# Patient Record
Sex: MALE | Race: WHITE | Employment: FULL TIME | URBAN - METROPOLITAN AREA
[De-identification: names, ages, dates, MRNs, and addresses within clinical notes are randomized per-mention and may not be internally consistent; named-entity substitution may affect disease eponyms.]

---

## 2021-05-16 ENCOUNTER — APPOINTMENT (OUTPATIENT)
Dept: GENERAL RADIOLOGY | Age: 46
End: 2021-05-16
Payer: COMMERCIAL

## 2021-05-16 ENCOUNTER — HOSPITAL ENCOUNTER (EMERGENCY)
Age: 46
Discharge: HOME | End: 2021-05-16
Payer: COMMERCIAL

## 2021-05-16 VITALS
SYSTOLIC BLOOD PRESSURE: 154 MMHG | RESPIRATION RATE: 16 BRPM | OXYGEN SATURATION: 96 % | TEMPERATURE: 97.5 F | WEIGHT: 241.62 LBS | DIASTOLIC BLOOD PRESSURE: 101 MMHG | BODY MASS INDEX: 35.68 KG/M2 | HEART RATE: 109 BPM

## 2021-05-16 DIAGNOSIS — M79.641 HAND PAIN, RIGHT: ICD-10-CM

## 2021-05-16 DIAGNOSIS — S92.355A CLOSED NONDISPLACED FRACTURE OF FIFTH METATARSAL BONE OF LEFT FOOT, INITIAL ENCOUNTER: Primary | ICD-10-CM

## 2021-05-16 DIAGNOSIS — M79.89 SWELLING OF RIGHT HAND: ICD-10-CM

## 2021-05-16 DIAGNOSIS — M79.672 LEFT FOOT PAIN: ICD-10-CM

## 2021-05-16 PROCEDURE — 99284 EMERGENCY DEPT VISIT MOD MDM: CPT

## 2021-05-16 PROCEDURE — 73110 X-RAY EXAM OF WRIST: CPT

## 2021-05-16 PROCEDURE — 73630 X-RAY EXAM OF FOOT: CPT

## 2021-05-16 PROCEDURE — 6370000000 HC RX 637 (ALT 250 FOR IP): Performed by: NURSE PRACTITIONER

## 2021-05-16 RX ORDER — PREDNISONE 10 MG/1
40 TABLET ORAL DAILY
Qty: 20 TABLET | Refills: 0 | Status: SHIPPED | OUTPATIENT
Start: 2021-05-17 | End: 2021-05-22

## 2021-05-16 RX ORDER — CEPHALEXIN 500 MG/1
500 CAPSULE ORAL 4 TIMES DAILY
Qty: 40 CAPSULE | Refills: 0 | Status: SHIPPED | OUTPATIENT
Start: 2021-05-16 | End: 2021-05-26

## 2021-05-16 RX ORDER — ACETAMINOPHEN 500 MG
1000 TABLET ORAL 4 TIMES DAILY PRN
Qty: 60 TABLET | Refills: 0 | Status: SHIPPED | OUTPATIENT
Start: 2021-05-16 | End: 2022-06-28

## 2021-05-16 RX ORDER — CEPHALEXIN 500 MG/1
500 CAPSULE ORAL ONCE
Status: COMPLETED | OUTPATIENT
Start: 2021-05-16 | End: 2021-05-16

## 2021-05-16 RX ORDER — ACETAMINOPHEN 500 MG
1000 TABLET ORAL ONCE
Status: COMPLETED | OUTPATIENT
Start: 2021-05-16 | End: 2021-05-16

## 2021-05-16 RX ORDER — PREDNISONE 20 MG/1
60 TABLET ORAL ONCE
Status: COMPLETED | OUTPATIENT
Start: 2021-05-16 | End: 2021-05-16

## 2021-05-16 RX ADMIN — CEPHALEXIN 500 MG: 500 CAPSULE ORAL at 21:25

## 2021-05-16 RX ADMIN — PREDNISONE 60 MG: 20 TABLET ORAL at 21:26

## 2021-05-16 RX ADMIN — ACETAMINOPHEN 1000 MG: 500 TABLET ORAL at 21:25

## 2021-05-16 ASSESSMENT — PAIN SCALES - GENERAL: PAINLEVEL_OUTOF10: 7

## 2021-05-17 ENCOUNTER — OFFICE VISIT (OUTPATIENT)
Dept: ORTHOPEDIC SURGERY | Age: 46
End: 2021-05-17
Payer: COMMERCIAL

## 2021-05-17 ENCOUNTER — NURSE TRIAGE (OUTPATIENT)
Dept: OTHER | Facility: CLINIC | Age: 46
End: 2021-05-17

## 2021-05-17 ENCOUNTER — TELEPHONE (OUTPATIENT)
Dept: ORTHOPEDIC SURGERY | Age: 46
End: 2021-05-17

## 2021-05-17 VITALS — BODY MASS INDEX: 37.92 KG/M2 | WEIGHT: 256 LBS | HEIGHT: 69 IN | TEMPERATURE: 98.2 F

## 2021-05-17 DIAGNOSIS — M65.9 TENOSYNOVITIS OF RIGHT WRIST: Primary | ICD-10-CM

## 2021-05-17 PROBLEM — M65.931 TENOSYNOVITIS OF RIGHT WRIST: Status: ACTIVE | Noted: 2021-05-17

## 2021-05-17 PROCEDURE — 99203 OFFICE O/P NEW LOW 30 MIN: CPT | Performed by: ORTHOPAEDIC SURGERY

## 2021-05-17 NOTE — LETTER
[facility]  [address] [place] [address] [address] [address]  Phone: [phone]  Fax: [phone]    Cordell Duron MD        May 17, 2021     Patient: Francisco Francis   YOB: 1975   Date of Visit: 5/17/2021       To Whom It May Concern: It is my medical opinion that Francisco Francis can return to work with limited use of right hand for the nex 7 days. If you have any questions or concerns, please don't hesitate to call.     Sincerely,           Cordell Duron MD

## 2021-05-17 NOTE — ED NOTES
.Pt discharged at this time. Discharge instructions and medications reviewed,  Questions were answered. PT verbalized understanding. .  Follow up appointments were discussed.          [facility]  05/16/21 [id number]

## 2021-05-17 NOTE — ED PROVIDER NOTES
[address]  [address] [address]  Dept: [phone]  Loc: [address] ENCOUNTER        This patient was not seen or evaluated by the attending physician. I evaluated this patient, the attending physician was available for consultation. CHIEF COMPLAINT    Chief Complaint   Patient presents with    Hand Pain     patinet states that he started having pain in his right hand last tuesday. Pt. states that the pain then moved into his right finger and wrist and now his right hand is swollen. HPI    Alyce Fletcher is a 39 y.o. male who presents with left hand/wrist pain and swelling, and left foot pain. The onset was 3 weeks ago for his left foot has been intermittent. Left hand pain started a few days ago and migrated down into the left wrist.  Had some swelling of his left hand. No swelling of the left foot. No numbness or tingling distal to either site of complaint. The duration has been constant since the onset. The quality of the pain is sharp and the severity is 5/10. The patient has no other associated injury. The context is no specific injury or trauma event. Foot pain worsens with ambulation, hand pain worsens with movements. Came to the ED for further evaluation and treatment. Is not diabetic or immunocompromised. Does have a history of gout. Is on colchicine and indomethacin. REVIEW OF SYSTEMS    Skin: No lacerations or puncture wounds  Musculoskeletal: see HPI, no other joint or bony injury or pain  Neurologic: No loss of consciousness, no head injury, no paresthesias or focal distal extremity weakness  All other systems reviewed and are negative.     PAST MEDICAL & SURGICAL HISTORY    Past Medical History:   Diagnosis Date    Gout     Seborrheic dermatitis      Past Surgical History:   Procedure Laterality Date    ORTHOPEDIC SURGERY  1992    2ndary to [facility], scar tissue removal       CURRENT MEDICATIONS  (may include discharge medications prescribed in the ED)  Current Outpatient Rx   Medication Sig Dispense Refill    cephALEXin (KEFLEX) 500 MG capsule Take 1 capsule by mouth 4 times daily for 10 days 40 capsule 0    [START ON 5/17/2021] predniSONE (DELTASONE) 10 MG tablet Take 4 tablets by mouth daily for 5 doses 20 tablet 0    acetaminophen (TYLENOL) 500 MG tablet Take 2 tablets by mouth 4 times daily as needed for Pain 60 tablet 0    colchicine (COLCRYS) 0.6 MG tablet Take 1 tablet by mouth daily. 30 tablet 0    indomethacin (INDOCIN) 50 MG capsule Take 1 capsule by mouth 3 times daily. 60 capsule 0    allopurinol (ZYLOPRIM) 300 MG tablet TAKE ONE TABLET BY MOUTH EVERY DAY 30 tablet 5    ketoconazole (NIZORAL) 2 % cream Apply  topically daily. Apply topically daily. 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- [employer]   Tobacco Use    Smoking status: Never Smoker   Substance and Sexual Activity    Alcohol use: Yes     Comment: socially    Drug use: Not on file    Sexual activity: Not on file   Other Topics Concern    Not on file   Social History Narrative    Not on file     Social Determinants of Health     Financial Resource Strain:     Difficulty of Paying Living Expenses:    Food Insecurity:     Worried About Running Out of Food in the Last Year:     [address] in the Last Year:    Transportation Needs:     Lack of Transportation (Medical):      Lack of Transportation (Non-Medical):    Physical Activity:     Days of Exercise per Week:     Minutes of Exercise per Session:    Stress:     Feeling of Stress :    Social Connections:     Frequency of Communication with Friends and Family:     Frequency of Social Gatherings with Friends and Family:     Attends Zoroastrianism Services:     Active Member of Clubs or Organizations:     Attends Club or Organization Meetings:     Marital Status:    Intimate Partner Violence:     Fear of Current or Ex-Partner:     Emotionally Abused:     Physically Abused:     Sexually Abused:      Family History   Problem Relation Age of Onset    Cancer Father         Pancreatic    Diabetes Father          PHYSICAL EXAM    VITAL SIGNS: BP (!) 154/101   Pulse 109   Temp 97.5 °F (36.4 °C) (Temporal)   Resp 16   Wt 241 lb 10 oz (109.6 kg)   SpO2 96%   BMI 35.68 kg/m²   Constitutional:  Well nourished, no acute distress  HENT:  Atraumatic, moist mucous membranes  NECK: normal range of motion,  supple   Respiratory:  No respiratory distress  Cardiovascular:  No JVD  Vascular: left radial and right pedal pulses 2+, capillary refill less than 2 seconds  Musculoskeletal:  No overt tenderness to palpation of the left hand. Does have pain with movement of his left thumb. There is overt edema to the left hand. No acute deformity. There is some very mild erythema that is less than 2 cm on the dorsal aspect of the left hand. Is not overtly hot to the touch. Right foot does not have any pain with palpation, no edema, no deformity. No overlying erythema. Integument:  Well hydrated, no skin lacerations  Neurologic:  Awake alert, no slurred speech, sensory and motor intact    RADIOLOGY   XR FOOT LEFT (MIN 3 VIEWS)   Final Result   Nondisplaced fracture along the base of the proximal phalanx of the 5th digit. XR WRIST RIGHT (MIN 3 VIEWS)   Final Result   No acute osseous abnormality. ED COURSE & MEDICAL DECISION MAKING   See chart for medications given during emergency department course.     Differential diagnosis: Arterial Injury/Ischemia, Fracture, Dislocation, Infection, Compartment Syndrome, Neurologic Deficit/Injury, ligamental injury, musculoskeletal pain, other    No evidence of neurovascular injury on exam.  Plain films as above. Given the right hand swelling I will place him on a short course of prednisone, cover him with Keflex for any concomitant early cellulitis that may be developing. Could also be gout which the prednisone will cover. Regarding the foot: A postop shoe was given since this is been ongoing for 3 weeks with no injury event. He has been weightbearing over the past few weeks. Will be started on some APAP for the left foot pain. He does need to follow-up with orthopedics, given that he was already told to follow-up regarding his hand he can be seen by orthopedics for his left 5th metatarsal fracture as well. I estimate there is LOW risk for COMPARTMENT SYNDROME, DEEP VENOUS THROMBOSIS, SEPTIC ARTHRITIS, TENDON OR NEUROVASCULAR INJURY, thus I consider the discharge disposition reasonable. Nestor Church and I have discussed the diagnosis and risks, and we agree with discharging home to follow-up with their primary doctor or the referral orthopedist. We also discussed returning to the Emergency Department immediately if new or worsening symptoms occur. We have discussed the symptoms which are most concerning (e.g., changing or worsening pain, numbness, weakness) that necessitate immediate return. The patient verbalized understanding, have no further questions or concerns and are in agreement with the plan of discharge. Blood pressure (!) 154/101, pulse 109, temperature 97.5 °F (36.4 °C), temperature source Temporal, resp. rate 16, weight 241 lb 10 oz (109.6 kg), SpO2 96 %. FINAL IMPRESSION    1. Closed nondisplaced fracture of fifth metatarsal bone of left foot, initial encounter    2. Hand pain, right    3. Swelling of right hand    4.  Left foot pain        PLAN  Discharge with outpatient follow-up and discharge instructions (see EMR)    (Please note that this note was completed with a voice recognition program.  Every attempt was made to edit the dictations, but inevitably there remain words that are mis-transcribed.)          AMI Moreno - ABIGAIL  05/16/21 [id number]

## 2021-05-17 NOTE — PROGRESS NOTES
Impression: Acute inflammatory soft tissue disease right wrist.  Possible early carpal tunnel syndrome. The nature of this medical problem is fully discussed with the patient, including all treatment options. All questions are answered. He is asked to continue the oral steroids as prescribed. I expect his improvement to continue. He is to make an appointment with his PCP and be evaluated for medical problems associated with this type of problem. If his symptoms persist, change or worsen, he is asked to call or return.

## 2021-05-17 NOTE — TELEPHONE ENCOUNTER
or concerns; instructed to call back for any new or worsening symptoms. Writer provided warm transfer to Nahun Avery at [facility] for appointment scheduling. Attention Provider: Thank you for allowing me to participate in the care of your patient. The patient was connected to triage in response to information provided to the ECC. Please do not respond through this encounter as the response is not directed to a shared pool.

## 2021-05-19 ENCOUNTER — OFFICE VISIT (OUTPATIENT)
Dept: ORTHOPEDIC SURGERY | Age: 46
End: 2021-05-19
Payer: COMMERCIAL

## 2021-05-19 ENCOUNTER — OFFICE VISIT (OUTPATIENT)
Dept: PRIMARY CARE CLINIC | Age: 46
End: 2021-05-19
Payer: COMMERCIAL

## 2021-05-19 VITALS
HEART RATE: 83 BPM | HEIGHT: 69 IN | WEIGHT: 241.2 LBS | DIASTOLIC BLOOD PRESSURE: 89 MMHG | BODY MASS INDEX: 35.73 KG/M2 | SYSTOLIC BLOOD PRESSURE: 128 MMHG

## 2021-05-19 VITALS — BODY MASS INDEX: 35.7 KG/M2 | WEIGHT: 241 LBS | TEMPERATURE: 98.2 F | HEIGHT: 69 IN

## 2021-05-19 DIAGNOSIS — M1A.9XX0 CHRONIC GOUT INVOLVING TOE OF LEFT FOOT WITHOUT TOPHUS, UNSPECIFIED CAUSE: ICD-10-CM

## 2021-05-19 DIAGNOSIS — Z13.220 SCREENING CHOLESTEROL LEVEL: ICD-10-CM

## 2021-05-19 DIAGNOSIS — M92.61 HAGLUND'S DEFORMITY OF BOTH HEELS: ICD-10-CM

## 2021-05-19 DIAGNOSIS — M79.671 RIGHT FOOT PAIN: Primary | ICD-10-CM

## 2021-05-19 DIAGNOSIS — M92.62 HAGLUND'S DEFORMITY OF BOTH HEELS: ICD-10-CM

## 2021-05-19 DIAGNOSIS — M76.61 ACHILLES TENDINITIS OF BOTH LOWER EXTREMITIES: ICD-10-CM

## 2021-05-19 DIAGNOSIS — M79.641 RIGHT HAND PAIN: ICD-10-CM

## 2021-05-19 DIAGNOSIS — M76.62 ACHILLES TENDINITIS OF BOTH LOWER EXTREMITIES: ICD-10-CM

## 2021-05-19 DIAGNOSIS — E66.09 CLASS 2 OBESITY DUE TO EXCESS CALORIES WITHOUT SERIOUS COMORBIDITY WITH BODY MASS INDEX (BMI) OF 37.0 TO 37.9 IN ADULT: Primary | ICD-10-CM

## 2021-05-19 LAB
A/G RATIO: 1.9 (ref 1.1–2.2)
ALBUMIN SERPL-MCNC: 4.7 G/DL (ref 3.4–5)
ALP BLD-CCNC: 62 U/L (ref 40–129)
ALT SERPL-CCNC: 31 U/L (ref 10–40)
ANION GAP SERPL CALCULATED.3IONS-SCNC: 15 MMOL/L (ref 3–16)
AST SERPL-CCNC: 21 U/L (ref 15–37)
BILIRUB SERPL-MCNC: 0.3 MG/DL (ref 0–1)
BUN BLDV-MCNC: 16 MG/DL (ref 7–20)
CALCIUM SERPL-MCNC: 9.7 MG/DL (ref 8.3–10.6)
CHLORIDE BLD-SCNC: 102 MMOL/L (ref 99–110)
CHOLESTEROL, TOTAL: 225 MG/DL (ref 0–199)
CO2: 25 MMOL/L (ref 21–32)
CREAT SERPL-MCNC: 0.9 MG/DL (ref 0.9–1.3)
GFR AFRICAN AMERICAN: >60
GFR NON-AFRICAN AMERICAN: >60
GLOBULIN: 2.5 G/DL
GLUCOSE BLD-MCNC: 103 MG/DL (ref 70–99)
HDLC SERPL-MCNC: 51 MG/DL (ref 40–60)
LDL CHOLESTEROL CALCULATED: 155 MG/DL
POTASSIUM SERPL-SCNC: 4.5 MMOL/L (ref 3.5–5.1)
SODIUM BLD-SCNC: 142 MMOL/L (ref 136–145)
TOTAL PROTEIN: 7.2 G/DL (ref 6.4–8.2)
TRIGL SERPL-MCNC: 97 MG/DL (ref 0–150)
URIC ACID, SERUM: 6.9 MG/DL (ref 3.5–7.2)
VLDLC SERPL CALC-MCNC: 19 MG/DL

## 2021-05-19 PROCEDURE — 99243 OFF/OP CNSLTJ NEW/EST LOW 30: CPT | Performed by: ORTHOPAEDIC SURGERY

## 2021-05-19 PROCEDURE — 99214 OFFICE O/P EST MOD 30 MIN: CPT | Performed by: FAMILY MEDICINE

## 2021-05-19 RX ORDER — NAPROXEN 500 MG/1
500 TABLET ORAL 2 TIMES DAILY WITH MEALS
Qty: 60 TABLET | Refills: 0 | Status: SHIPPED | OUTPATIENT
Start: 2021-05-19 | End: 2021-06-15

## 2021-05-19 RX ORDER — COLCHICINE 0.6 MG/1
0.6 TABLET ORAL DAILY
Qty: 30 TABLET | Refills: 3 | Status: SHIPPED | OUTPATIENT
Start: 2021-05-19 | End: 2022-06-28 | Stop reason: SDUPTHER

## 2021-05-19 ASSESSMENT — ENCOUNTER SYMPTOMS
DIARRHEA: 0
COUGH: 0
ABDOMINAL PAIN: 0
NAUSEA: 0
VOMITING: 0
SHORTNESS OF BREATH: 0
CONSTIPATION: 0

## 2021-05-19 ASSESSMENT — PATIENT HEALTH QUESTIONNAIRE - PHQ9
SUM OF ALL RESPONSES TO PHQ QUESTIONS 1-9: 0
SUM OF ALL RESPONSES TO PHQ QUESTIONS 1-9: 0

## 2021-05-19 NOTE — LETTER
[facility]  [facility] [address] [address]  Phone: [phone]  Fax: [phone]    Greg Harrison MD        May 19, 2021     Patient: Tracy Larsen   YOB: 1975   Date of Visit: 5/19/2021       To Whom It May Concern: It is my medical opinion that Tracy Larsen should remain out of work until 5/22/2021. He may return to work 5/23/2021 with no restrictions. If you have any questions or concerns, please don't hesitate to call.     Sincerely,          Greg Harrison MD

## 2021-05-19 NOTE — PROGRESS NOTES
Chief Complaint   Patient presents with   Aruna Benjamin New Doctor    Obesity    Other     Tenosynovitis  Right wrist       HPI: Yaz Juan presents for evaluation and management of right hand pain, gout, obesity. Francisco Francis returns to clinic after an extended hiatus of over 7 years. He had about a 6-week history of bilateral foot pain left worse than right both in the ankle and the midfoot. He went to the emergency room when he was diagnosed with a left fifth proximal phalangeal fracture and put in a rigid soled shoe. He has follow-up with Dr. Umer Black later today    He also notes an 8-day history of right hand pain that initially started along his right fifth metacarpal bone after 2 days it migrated to his metacarpophalangeal joints in 3 days and 4 days into it it settled into his right first metacarpal phalangeal joint and wrist joint. Patient noted he had pain with just passive movement of the joint and the whole hand was swollen and red. He has a history of chronic gout in his left great toe. He was previously treated with allopurinol and colchicine but was lost to follow-up back in 2014. He notes he eats a lot of chicken but has given up a lot of alcohol use. Today's PHQ:    PHQ Scores 5/19/2021   PHQ2 Score 0   PHQ9 Score 0     Interpretation of Total Score Depression Severity: 1-4 = Minimal depression, 5-9 = Mild depression, 10-14 = Moderate depression, 15-19 = Moderately severe depression, 20-27 = Severe depression        Review of Systems   Constitutional: Negative for chills and fever. Respiratory: Negative for cough and shortness of breath. Cardiovascular: Negative for chest pain and palpitations. Gastrointestinal: Negative for abdominal pain, constipation, diarrhea, nausea and vomiting. Endocrine: Negative for polyuria. Genitourinary: Negative for dysuria. Musculoskeletal: Positive for arthralgias and joint swelling.        No Known Allergies  New Prescriptions    COLCHICINE (COLCRYS) 0.6 MG TABLET    Take 1 tablet by mouth daily     Current Outpatient Medications   Medication Sig Dispense Refill    colchicine (COLCRYS) 0.6 MG tablet Take 1 tablet by mouth daily 30 tablet 3    cephALEXin (KEFLEX) 500 MG capsule Take 1 capsule by mouth 4 times daily for 10 days 40 capsule 0    predniSONE (DELTASONE) 10 MG tablet Take 4 tablets by mouth daily for 5 doses 20 tablet 0    acetaminophen (TYLENOL) 500 MG tablet Take 2 tablets by mouth 4 times daily as needed for Pain 60 tablet 0     No current facility-administered medications for this visit. Past Medical History:   Diagnosis Date    Gout     Seborrheic dermatitis          Objective   /89   Pulse 83   Ht 5' 9\" (1.753 m)   Wt 241 lb 3.2 oz (109.4 kg)   BMI 35.62 kg/m²   Wt Readings from Last 3 Encounters:   05/19/21 241 lb 3.2 oz (109.4 kg)   05/17/21 256 lb (116.1 kg)   05/16/21 241 lb 10 oz (109.6 kg)       Physical Exam  Constitutional:       Appearance: He is well-developed. He is obese. Cardiovascular:      Rate and Rhythm: Normal rate and regular rhythm. Heart sounds: No murmur heard. No friction rub. No gallop. Pulmonary:      Effort: Pulmonary effort is normal.      Breath sounds: Normal breath sounds. No wheezing or rales. Abdominal:      General: Bowel sounds are normal. There is no distension. Palpations: Abdomen is soft. There is no mass. Tenderness: There is no abdominal tenderness. Musculoskeletal:      Comments: Tenderness of right first MCP joint and swelling of right wrist and first MCP joint and palm. Some pain on passive movement of these joints   Skin:     General: Skin is warm and dry. Findings: No rash.            Chemistry        Component Value Date/Time     10/13/2011 1816    K 4.6 10/13/2011 1816     10/13/2011 1816    CO2 32 10/13/2011 1816    BUN 16 10/13/2011 1816    CREATININE 0.9 10/13/2011 1816        Component Value Date/Time    CALCIUM 9.9 10/13/2011 1816    ALKPHOS 63 10/13/2011 1816    AST 38 (H) 10/13/2011 1816    ALT 59 (H) 10/13/2011 1816    BILITOT 0.60 10/13/2011 1816          No results found for: WBC, HGB, HCT, MCV, PLT  No results found for: LABA1C  No results found for: EAG  No results found for: LABA1C  No components found for: CHLPL  No results found for: TRIG  No results found for: HDL  No results found for: LDLCALC  No results found for: LABVLDL      Assessment   Plan   1. Class 2 obesity due to excess calories without serious comorbidity with body mass index (BMI) of 37.0 to 37.9 in adult   Counseled briefly on diet. Encouraged patient to follow low purine diet for now. We will readdress when he comes back in 1 month  2. Chronic gout involving toe of left foot without tophus, unspecified cause  Assessment & Plan:   suspect may be cause of patient's wrist pain, will check labs and trial colchicine. Orders:  -     Comprehensive Metabolic Panel; Future  -     Uric Acid; Future  -     colchicine (COLCRYS) 0.6 MG tablet; Take 1 tablet by mouth daily, Disp-30 tablet, R-3Normal  3. Screening cholesterol level  -     Comprehensive Metabolic Panel; Future  -     Lipid Panel; Future  4. Right hand pain  As above  Diann Mathew received counseling on the following healthy behaviors: nutrition    Patient given educational materials on Nutrition      Discussed use, benefit, and side effects of prescribed medications. Barriers to medication compliance addressed. All patient questions answered. Pt voiced understanding. RTC Return in about 4 weeks (around 6/16/2021).

## 2021-05-19 NOTE — PROGRESS NOTES
CHIEF COMPLAINT: Bilateral posterior heel pain/Nereyda's deformity with insertinal Achillis tendenosis. HISTORY:  Mr. Villeda Friend 39 y.o.  male presents today for consultation request from Aniceto Jama MD for evaluation of bilateral posterior heel pain which started started worsening a year ago. He stated he has a job that requires a lot of walking.  He initially presented to [address] [place] [address] on 5/16/2021 where he was x-rayed and placed in a orthopedic shoe and told him that he had a toe fracture. He does not remember ever injuring his toe. He is complaining of achy  pain that is intermittent and that is in the back of bilateral heels. Pain is increase with standing and walking and shoe wear. Rates pain a 5/10 VAS. Pain is sharp early in the morning with first few steps, dull achy pain by the end of the day. No radiation and no numbness and tingling sensation. No other complaint.       Past Medical History:   Diagnosis Date    Gout     Seborrheic dermatitis        Past Surgical History:   Procedure Laterality Date    ORTHOPEDIC SURGERY  1992    2ndary to [facility], scar tissue removal       Social History     Socioeconomic History    Marital status: Single     Spouse name: Napoleon Grijalva Number of children: 0    Years of education: Not on file    Highest education level: Not on file   Occupational History    Occupation: Claims Rep- [employer]   Tobacco Use    Smoking status: Never Smoker    Smokeless tobacco: Never Used   Vaping Use    Vaping Use: Never used   Substance and Sexual Activity    Alcohol use: Yes     Comment: socially    Drug use: Never    Sexual activity: Not on file   Other Topics Concern    Not on file   Social History Narrative    Not on file     Social Determinants of Health     Financial Resource Strain:     Difficulty of Paying Living Expenses:    Food Insecurity:     Worried About Running Out of Food in the Last Year:     [address] in the Last Year: dorsiflexion to about 10 degrees bilaterally, which increased with knee flexion. He has intact sensation and good pedal pulses.  He has good strength in all four planes, including eversion, and has moderate tenderness on deep palpation over the bilateral posterior calcaneal tubercle, with prominent Nereyda's compared to the other side.  The ankles are stable to drawer test bilaterally, equally.  He is no tenderness to palpation over the fifth toe of the left foot. There is no swelling or ecchymosis left foot near the toes. IMAGING:  Xray's were reviewed.  3 views of the left foot dated 5/16/2021 and 3 views of the right foot taken in office today, and showed no acute fracture. 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 PLAN: I discussed with the patient the treatment options. We recommended stretching exercises of the calf which was taught to the patient today. He will take NSAIDS Naprosyn. Use backless shoes. He can discontinue the postop shoe. F/u in 6 weeks, PT if needed. He understands that this may need surgery if the pain did not to resolve. Thank you very much for the kind consultation and allowing me to participate in this patient's care. I will continue to keep you apprised of his progress.         Licha Escoto MD

## 2021-05-19 NOTE — PATIENT INSTRUCTIONS
Patient Education        Gout: Care Instructions  Overview     Gout is a form of arthritis caused by a buildup of uric acid crystals in a joint. It causes sudden attacks of pain, swelling, redness, and stiffness, usually in one joint, especially the big toe. Gout usually comes on without a cause. But it can be brought on by drinking alcohol (especially beer), eating or drinking things made with high-fructose corn syrup, or eating seafood or red meat. Taking certain medicines, such as diuretics, can also trigger an attack of gout. Taking your medicines as prescribed and following up with your doctor regularly can help you avoid gout attacks in the future. Follow-up care is a key part of your treatment and safety. Be sure to make and go to all appointments, and call your doctor if you are having problems. It's also a good idea to know your test results and keep a list of the medicines you take. How can you care for yourself at home? · If the joint is swollen, put ice or a cold pack on the area for 10 to 20 minutes at a time. Put a thin cloth between the ice and your skin. · Prop up the sore limb on a pillow when you ice it or anytime you sit or lie down during the next 3 days. Try to keep it above the level of your heart. This can help reduce swelling. · Rest sore joints. Avoid activities that put weight or strain on the joints for a few days. Take short rest breaks from your regular activities during the day. · Take your medicines exactly as prescribed. Call your doctor if you think you are having a problem with your medicine. · Take pain medicines exactly as directed. ? If the doctor gave you a prescription medicine for pain, take it as prescribed. ? If you are not taking a prescription pain medicine, ask your doctor if you can take an over-the-counter medicine. · Eat less seafood and red meat. · Avoid foods or drinks that are made with high-fructose corn syrup.   · Check with your doctor before drinking alcohol. · Losing weight, if you are overweight, may help reduce attacks of gout. But do not go on a diet that causes rapid weight loss. Losing a lot of weight in a short amount of time can cause a gout attack. When should you call for help? Call your doctor now or seek immediate medical care if:    · You have a fever.     · The joint is so painful you cannot use it.     · You have sudden, unexplained swelling, redness, warmth, or severe pain in one or more joints. Watch closely for changes in your health, and be sure to contact your doctor if:    · You have joint pain.     · Your symptoms get worse or are not improving after 2 or 3 days. Where can you learn more? Go to [email or web address] and sign in to your [employer] account. Enter Z311 in the Epoque box to learn more about \"Gout: Care Instructions. \"     If you do not have an account, please click on the \"Sign Up Now\" link. Current as of: August 5, 2020               Content Version: 12.8  © 2006-2021 [employer]. Care instructions adapted under license by [facility] ([facility]). If you have questions about a medical condition or this instruction, always ask your healthcare professional. [address] any warranty or liability for your use of this information. Patient Education        Purine-Restricted Diet: Care Instructions  Your Care Instructions     Purines are substances that are found in some foods. Your body turns purines into uric acid. High levels of uric acid can cause gout, which is a form of arthritis that causes pain and inflammation in joints. You may be able to help control the amount of uric acid in your body by limiting high-purine foods in your diet. Follow-up care is a key part of your treatment and safety. Be sure to make and go to all appointments, and call your doctor if you are having problems.  It's also a good idea to know your test results and keep

## 2021-05-21 ENCOUNTER — TELEPHONE (OUTPATIENT)
Dept: ORTHOPEDIC SURGERY | Age: 46
End: 2021-05-21

## 2021-05-24 PROBLEM — M92.62 HAGLUND'S DEFORMITY OF BOTH HEELS: Status: ACTIVE | Noted: 2021-05-24

## 2021-05-24 PROBLEM — M92.61 HAGLUND'S DEFORMITY OF BOTH HEELS: Status: ACTIVE | Noted: 2021-05-24

## 2021-05-24 PROBLEM — M76.62 ACHILLES TENDINITIS OF BOTH LOWER EXTREMITIES: Status: ACTIVE | Noted: 2021-05-24

## 2021-05-24 PROBLEM — M76.61 ACHILLES TENDINITIS OF BOTH LOWER EXTREMITIES: Status: ACTIVE | Noted: 2021-05-24

## 2021-06-15 DIAGNOSIS — M79.671 RIGHT FOOT PAIN: ICD-10-CM

## 2021-06-15 RX ORDER — NAPROXEN 500 MG/1
TABLET ORAL
Qty: 60 TABLET | Refills: 0 | Status: SHIPPED | OUTPATIENT
Start: 2021-06-15 | End: 2022-06-28

## 2021-07-02 ENCOUNTER — OFFICE VISIT (OUTPATIENT)
Dept: ORTHOPEDIC SURGERY | Age: 46
End: 2021-07-02
Payer: COMMERCIAL

## 2021-07-02 VITALS — BODY MASS INDEX: 35.7 KG/M2 | WEIGHT: 241 LBS | HEIGHT: 69 IN | RESPIRATION RATE: 16 BRPM

## 2021-07-02 DIAGNOSIS — M92.61 HAGLUND'S DEFORMITY OF BOTH HEELS: Primary | ICD-10-CM

## 2021-07-02 DIAGNOSIS — M76.62 ACHILLES TENDINITIS OF BOTH LOWER EXTREMITIES: ICD-10-CM

## 2021-07-02 DIAGNOSIS — M76.61 ACHILLES TENDINITIS OF BOTH LOWER EXTREMITIES: ICD-10-CM

## 2021-07-02 DIAGNOSIS — M92.62 HAGLUND'S DEFORMITY OF BOTH HEELS: Primary | ICD-10-CM

## 2021-07-02 PROCEDURE — 99213 OFFICE O/P EST LOW 20 MIN: CPT | Performed by: ORTHOPAEDIC SURGERY

## 2021-07-02 NOTE — PROGRESS NOTES
CHIEF COMPLAINT: Bilateral posterior heel pain/Nereyda's deformity with insertinal Achillis tendenosis. HISTORY:  Mr. Romy Sprague 39 y.o.  male presents today for f/u and reported significant improvement with stretching. He was seen on 5/19/2021 as a consultation request from Yolis Tam MD for evaluation of bilateral posterior heel pain which started started worsening a year ago. He stated he has a job that requires a lot of walking.  He initially presented to [facility] on 5/16/2021 where he was x-rayed and placed in a orthopedic shoe and told him that he had a toe fracture. He does not remember ever injuring his toe. He reported today no pain, 0/10 VAS. No radiation and no numbness and tingling sensation. No other complaint. Past Medical History:   Diagnosis Date    Gout     Seborrheic dermatitis        Past Surgical History:   Procedure Laterality Date    ORTHOPEDIC SURGERY  1992    2ndary to [facility] RLE, scar tissue removal       Social History     Socioeconomic History    Marital status: Single     Spouse name: Gail Bueno Number of children: 0    Years of education: Not on file    Highest education level: Not on file   Occupational History    Occupation: Claims Rep[employer]   Tobacco Use    Smoking status: Never Smoker    Smokeless tobacco: Never Used   Vaping Use    Vaping Use: Never used   Substance and Sexual Activity    Alcohol use: Yes     Comment: socially    Drug use: Never    Sexual activity: Not on file   Other Topics Concern    Not on file   Social History Narrative    Not on file     Social Determinants of Health     Financial Resource Strain:     Difficulty of Paying Living Expenses:    Food Insecurity:     Worried About [address] in the Last Year:     [address] in the Last Year:    Transportation Needs:     Lack of Transportation (Medical):      Lack of Transportation (Non-Medical):    Physical Activity:     Days of Exercise per Week:     Minutes of Exercise per Session:    Stress:     Feeling of Stress :    Social Connections:     Frequency of Communication with Friends and Family:     Frequency of Social Gatherings with Friends and Family:     Attends Hoahaoism Services:     Active Member of Clubs or Organizations:     Attends Club or Organization Meetings:     Marital Status:    Intimate Partner Violence:     Fear of Current or Ex-Partner:     Emotionally Abused:     Physically Abused:     Sexually Abused:        Family History   Problem Relation Age of Onset    Cancer Father         Pancreatic    Diabetes Father        Current Outpatient Medications on File Prior to Visit   Medication Sig Dispense Refill    naproxen (NAPROSYN) 500 MG tablet TAKE 1 TABLET BY MOUTH TWICE DAILY WITH MEALS 60 tablet 0    colchicine (COLCRYS) 0.6 MG tablet Take 1 tablet by mouth daily 30 tablet 3    acetaminophen (TYLENOL) 500 MG tablet Take 2 tablets by mouth 4 times daily as needed for Pain 60 tablet 0     No current facility-administered medications on file prior to visit. Pertinent items are noted in HPI  Review of systems reviewed from Patient History Form dated on 5/17/2021 and available in the patient's chart under the Media tab. No change noted. PHYSICAL EXAMINATION:  Mr. Dacia Arias is a very pleasant 39 y.o.  male who presents today in no acute distress, awake, alert, and oriented. He is well dressed, nourished and  groomed. Patient with normal affect. Height is  5' 9\" (1.753 m), weight is 241 lb (109.3 kg), Body mass index is 35.59 kg/m². Resting respiratory rate is 16. Examination of the gait, showed that the patient walks heel-toe with a non-antalgic gait and no limp.  Examination of both ankles showing a good range of motion.  He has dorsiflexion to about 10 degrees bilaterally, which increased with knee flexion.  He has intact sensation and good pedal pulses.  He has good strength in all four planes, including eversion, and has no tenderness on deep palpation over the bilateral posterior calcaneal tubercle, with prominent Nereyda's compared to the other side.  The ankles are stable to drawer test bilaterally, equally.  He is no tenderness to palpation over the fifth toe of the left foot. There is no swelling or ecchymosis left foot near the toes. IMAGING:  Xray's were reviewed.  3 views of the left foot dated 5/16/2021 and 3 views of the right foot taken in office today, and showed no acute fracture. 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 PLAN: I discussed with the patient the treatment options. We recommended continue stretching exercises of the calf which was taught to the patient. He will take NSAIDS Naprosyn PRN. Use backless shoes. F/U PRN.       Yoan Kwon MD

## 2021-11-08 ENCOUNTER — NURSE TRIAGE (OUTPATIENT)
Dept: OTHER | Facility: CLINIC | Age: 46
End: 2021-11-08

## 2021-11-08 NOTE — TELEPHONE ENCOUNTER
Received call from Mónica at [facility] with Red Flag Complaint. Brief description of triage: patient calling with c/o left shoulder pain. See below assessment. Triage indicates for patient to see PCP within two weeks, patient agreeable. Care advice provided, patient verbalizes understanding; denies any other questions or concerns; instructed to call back for any new or worsening symptoms. Writer provided warm transfer to Pablito Kaur at [facility] for appointment scheduling. Attention Provider: Thank you for allowing me to participate in the care of your patient. The patient was connected to triage in response to information provided to the ECC/PSC. Please do not respond through this encounter as the response is not directed to a shared pool. Reason for Disposition   MILD pain (e.g., does not interfere with normal activities) and present > 7 days    Answer Assessment - Initial Assessment Questions  1. ONSET: \"When did the pain start? \"      Thursday morning    2. LOCATION: \"Where is the pain located? \"      The top, at times the front and back, radiating into neck at times    3. PAIN: \"How bad is the pain? \" (Scale 1-10; or mild, moderate, severe)    - MILD (1-3): doesn't interfere with normal activities    - MODERATE (4-7): interferes with normal activities (e.g., work or school) or awakens from sleep    - SEVERE (8-10): excruciating pain, unable to do any normal activities, unable to move arm at all due to pain      3-4/10, doesn't interfere    4. WORK OR EXERCISE: \"Has there been any recent work or exercise that involved this part of the body? \"      Possibly slept on it wrong    5. CAUSE: \"What do you think is causing the shoulder pain? \"      Slept on it wrong    6. OTHER SYMPTOMS: \"Do you have any other symptoms? \" (e.g., neck pain, swelling, rash, fever, numbness, weakness)      Neck pain at times, unable to use the shoulder for lifting    7.  PREGNANCY: \"Is there any chance you are pregnant? \" \"When was your last menstrual period? \"      N/A    Protocols used: SHOULDER PAIN-ADULT-[place]

## 2022-06-27 DIAGNOSIS — M1A.9XX0 CHRONIC GOUT INVOLVING TOE OF LEFT FOOT WITHOUT TOPHUS, UNSPECIFIED CAUSE: ICD-10-CM

## 2022-06-27 RX ORDER — COLCHICINE 0.6 MG/1
0.6 TABLET ORAL DAILY
Qty: 30 TABLET | Refills: 3 | OUTPATIENT
Start: 2022-06-27

## 2022-06-28 ENCOUNTER — OFFICE VISIT (OUTPATIENT)
Dept: PRIMARY CARE CLINIC | Age: 47
End: 2022-06-28
Payer: COMMERCIAL

## 2022-06-28 VITALS
OXYGEN SATURATION: 98 % | DIASTOLIC BLOOD PRESSURE: 70 MMHG | BODY MASS INDEX: 36.58 KG/M2 | SYSTOLIC BLOOD PRESSURE: 126 MMHG | WEIGHT: 247 LBS | HEIGHT: 69 IN | HEART RATE: 84 BPM

## 2022-06-28 DIAGNOSIS — M10.041 ACUTE IDIOPATHIC GOUT OF RIGHT HAND: Primary | ICD-10-CM

## 2022-06-28 PROCEDURE — 99213 OFFICE O/P EST LOW 20 MIN: CPT | Performed by: NURSE PRACTITIONER

## 2022-06-28 RX ORDER — COLCHICINE 0.6 MG/1
0.6 TABLET ORAL 2 TIMES DAILY
Qty: 60 TABLET | Refills: 0 | Status: SHIPPED | OUTPATIENT
Start: 2022-06-28 | End: 2022-11-01 | Stop reason: SDUPTHER

## 2022-06-28 ASSESSMENT — ENCOUNTER SYMPTOMS
SHORTNESS OF BREATH: 0
COUGH: 0
COLOR CHANGE: 0

## 2022-06-28 ASSESSMENT — PATIENT HEALTH QUESTIONNAIRE - PHQ9
2. FEELING DOWN, DEPRESSED OR HOPELESS: 0
1. LITTLE INTEREST OR PLEASURE IN DOING THINGS: 0
SUM OF ALL RESPONSES TO PHQ QUESTIONS 1-9: 0
SUM OF ALL RESPONSES TO PHQ9 QUESTIONS 1 & 2: 0
SUM OF ALL RESPONSES TO PHQ QUESTIONS 1-9: 0

## 2022-06-28 NOTE — PATIENT INSTRUCTIONS
Patient Education        Gout: Care Instructions  Overview     Gout is a form of arthritis caused by a buildup of uric acid crystals in a joint. It causes sudden attacks of pain, swelling, redness, and stiffness,usually in one joint, especially the big toe. Gout usually comes on without a cause. But it can be brought on by drinking alcohol (especially beer), eating or drinking things made with high-fructose corn syrup, or eating seafood or red meat. Taking certain medicines, such asdiuretics, can also trigger an attack of gout. Taking your medicines as prescribed and following up with your doctor regularlycan help you avoid gout attacks in the future. Follow-up care is a key part of your treatment and safety. Be sure to make and go to all appointments, and call your doctor if you are having problems. It's also a good idea to know your test results and keep alist of the medicines you take. How can you care for yourself at home?  If the joint is swollen, put ice or a cold pack on the area for 10 to 20 minutes at a time. Put a thin cloth between the ice and your skin.  Prop up the sore limb on a pillow when you ice it or anytime you sit or lie down during the next 3 days. Try to keep it above the level of your heart. This can help reduce swelling.  Rest sore joints. Avoid activities that put weight or strain on the joints for a few days. Take short rest breaks from your regular activities during the day.  Take your medicines exactly as prescribed. Call your doctor if you think you are having a problem with your medicine.  Take pain medicines exactly as directed. ? If the doctor gave you a prescription medicine for pain, take it as prescribed. ? If you are not taking a prescription pain medicine, ask your doctor if you can take an over-the-counter medicine.  Eat less seafood and red meat.  Avoid foods or drinks that are made with high-fructose corn syrup.    Check with your doctor before drinking alcohol.  Losing weight, if you are overweight, may help reduce attacks of gout. But do not go on a diet that causes rapid weight loss. Losing a lot of weight in a short amount of time can cause a gout attack. When should you call for help? Call your doctor now or seek immediate medical care if:     You have a fever.      The joint is so painful you cannot use it.      You have sudden, unexplained swelling, redness, warmth, or severe pain in one or more joints. Watch closely for changes in your health, and be sure to contact your doctor if:     You have joint pain.      Your symptoms get worse or are not improving after 2 or 3 days. Where can you learn more? Go to [email or web address] org and sign in to your [employer] account. Enter Y003 in the [employer] box to learn more about \"Gout: Care Instructions. \"     If you do not have an account, please click on the \"Sign Up Now\" link. Current as of: December 20, 2021               Content Version: 13.3  © 2006-2022 Healthwise, Incorporated. Care instructions adapted under license by [facility] ([facility]). If you have questions about a medical condition or this instruction, always ask your healthcare professional. [place] [address] any warranty or liability for your use of this information.

## 2022-06-28 NOTE — TELEPHONE ENCOUNTER
Called pt to let him know that his medication has been refused because he has to be seen in office first before getting any refills.  Phone went to voicemail and mailbox is full

## 2022-06-28 NOTE — PROGRESS NOTES
Sveta Arce (:  1975) is a 55 y.o. male,Established patient, here for evaluation of the following chief complaint(s):  Hand Pain (pt is c/o of right hand pain- poss gout has had it in this hand before )    Reports hand pain starting on . No injury or bite to the area. Started off more as pain and started swelling today. Denies fever, chills, sweats, numbness, tingling. He has history of gout flares. Had his last gout flare little over a year ago had it in his hand again, had refills on the colchicine which he took and it resolved. He states on Saturday he had beer and steak. ASSESSMENT/PLAN:  1. Acute idiopathic gout of right hand  -     colchicine (COLCRYS) 0.6 MG tablet; Take 1 tablet by mouth 2 times daily, Disp-60 tablet, R-0Normal      Return if symptoms worsen or fail to improve.     -Discussed acute flare rx, and how to prevent gout attacks. Subjective   SUBJECTIVE/OBJECTIVE:    Review of Systems   Constitutional: Negative for chills, diaphoresis, fatigue and fever. Respiratory: Negative for cough and shortness of breath. Cardiovascular: Negative for chest pain, palpitations and leg swelling. Musculoskeletal: Positive for joint swelling. Negative for myalgias, neck pain and neck stiffness. Skin: Negative for color change, pallor, rash and wound. Neurological: Negative for weakness and numbness. Objective   Physical Exam  Vitals and nursing note reviewed. Constitutional:       Appearance: Normal appearance. He is well-developed and well-groomed. Cardiovascular:      Rate and Rhythm: Normal rate and regular rhythm. Pulses: Normal pulses. Heart sounds: Normal heart sounds, S1 normal and S2 normal.   Pulmonary:      Effort: Pulmonary effort is normal.      Breath sounds: Normal breath sounds and air entry. Musculoskeletal:      Right hand: Swelling and tenderness present. No deformity, lacerations or bony tenderness. Decreased range of motion. Normal strength. Normal sensation. There is no disruption of two-point discrimination. Normal capillary refill. Normal pulse. Left hand: Normal.        Hands:       Comments: Slight warmth and erythema, and significant swelling of hand and base of thumb joint. Neurological:      Mental Status: He is alert. Psychiatric:         Behavior: Behavior is cooperative. An electronic signature was used to authenticate this note.     --Tiny Head, APRN - CNP

## 2022-07-14 ENCOUNTER — OFFICE VISIT (OUTPATIENT)
Dept: PRIMARY CARE CLINIC | Age: 47
End: 2022-07-14
Payer: COMMERCIAL

## 2022-07-14 VITALS
BODY MASS INDEX: 35.99 KG/M2 | HEIGHT: 69 IN | WEIGHT: 243 LBS | SYSTOLIC BLOOD PRESSURE: 132 MMHG | OXYGEN SATURATION: 96 % | TEMPERATURE: 97.2 F | DIASTOLIC BLOOD PRESSURE: 76 MMHG | HEART RATE: 88 BPM

## 2022-07-14 DIAGNOSIS — Z11.4 SCREENING FOR HIV WITHOUT PRESENCE OF RISK FACTORS: ICD-10-CM

## 2022-07-14 DIAGNOSIS — R73.09 ELEVATED GLUCOSE: ICD-10-CM

## 2022-07-14 DIAGNOSIS — F51.01 PRIMARY INSOMNIA: ICD-10-CM

## 2022-07-14 DIAGNOSIS — M1A.9XX0 CHRONIC GOUT INVOLVING TOE OF LEFT FOOT WITHOUT TOPHUS, UNSPECIFIED CAUSE: ICD-10-CM

## 2022-07-14 DIAGNOSIS — Z71.89 ACP (ADVANCE CARE PLANNING): ICD-10-CM

## 2022-07-14 DIAGNOSIS — Z12.12 SCREENING FOR COLORECTAL CANCER: ICD-10-CM

## 2022-07-14 DIAGNOSIS — Z23 NEED FOR PROPHYLACTIC VACCINATION WITH TETANUS-DIPHTHERIA (TD): ICD-10-CM

## 2022-07-14 DIAGNOSIS — Z00.00 ENCOUNTER FOR WELL ADULT EXAM WITHOUT ABNORMAL FINDINGS: ICD-10-CM

## 2022-07-14 DIAGNOSIS — Z11.59 NEED FOR HEPATITIS C SCREENING TEST: ICD-10-CM

## 2022-07-14 DIAGNOSIS — E66.09 CLASS 2 OBESITY DUE TO EXCESS CALORIES WITHOUT SERIOUS COMORBIDITY WITH BODY MASS INDEX (BMI) OF 37.0 TO 37.9 IN ADULT: ICD-10-CM

## 2022-07-14 DIAGNOSIS — Z12.11 SCREENING FOR COLORECTAL CANCER: ICD-10-CM

## 2022-07-14 DIAGNOSIS — Z13.220 SCREENING CHOLESTEROL LEVEL: ICD-10-CM

## 2022-07-14 DIAGNOSIS — E66.09 CLASS 2 OBESITY DUE TO EXCESS CALORIES WITHOUT SERIOUS COMORBIDITY WITH BODY MASS INDEX (BMI) OF 36.0 TO 36.9 IN ADULT: Primary | ICD-10-CM

## 2022-07-14 PROCEDURE — 99396 PREV VISIT EST AGE 40-64: CPT | Performed by: FAMILY MEDICINE

## 2022-07-14 PROCEDURE — 90715 TDAP VACCINE 7 YRS/> IM: CPT | Performed by: FAMILY MEDICINE

## 2022-07-14 PROCEDURE — 99213 OFFICE O/P EST LOW 20 MIN: CPT | Performed by: FAMILY MEDICINE

## 2022-07-14 PROCEDURE — 90471 IMMUNIZATION ADMIN: CPT | Performed by: FAMILY MEDICINE

## 2022-07-14 PROCEDURE — 99497 ADVNCD CARE PLAN 30 MIN: CPT | Performed by: FAMILY MEDICINE

## 2022-07-14 SDOH — ECONOMIC STABILITY: FOOD INSECURITY: WITHIN THE PAST 12 MONTHS, YOU WORRIED THAT YOUR FOOD WOULD RUN OUT BEFORE YOU GOT MONEY TO BUY MORE.: NEVER TRUE

## 2022-07-14 SDOH — ECONOMIC STABILITY: FOOD INSECURITY: WITHIN THE PAST 12 MONTHS, THE FOOD YOU BOUGHT JUST DIDN'T LAST AND YOU DIDN'T HAVE MONEY TO GET MORE.: NEVER TRUE

## 2022-07-14 ASSESSMENT — ENCOUNTER SYMPTOMS
EYE PAIN: 0
SHORTNESS OF BREATH: 0
DIARRHEA: 0
COUGH: 0
COLOR CHANGE: 0
NAUSEA: 0
SORE THROAT: 0
CONSTIPATION: 0
VOMITING: 0
ABDOMINAL PAIN: 0
RHINORRHEA: 0

## 2022-07-14 ASSESSMENT — PATIENT HEALTH QUESTIONNAIRE - PHQ9
SUM OF ALL RESPONSES TO PHQ9 QUESTIONS 1 & 2: 1
SUM OF ALL RESPONSES TO PHQ QUESTIONS 1-9: 1
1. LITTLE INTEREST OR PLEASURE IN DOING THINGS: 1
2. FEELING DOWN, DEPRESSED OR HOPELESS: 0

## 2022-07-14 ASSESSMENT — SOCIAL DETERMINANTS OF HEALTH (SDOH): HOW HARD IS IT FOR YOU TO PAY FOR THE VERY BASICS LIKE FOOD, HOUSING, MEDICAL CARE, AND HEATING?: NOT HARD AT ALL

## 2022-07-14 NOTE — PROGRESS NOTES
Well Adult Note  Name: Adonis Tavarez Date: 2022   MRN: [id number] Sex: Male   Age: 55 y.o. Ethnicity: Non- / Non    : 1975 Race: White (non-)      Meño Carranza is here for well adult exam.  History:  He notes he is not exercising currently though he has a [id number] membership. He tells me he sleeps very poorly anywhere from 3 to 8 hours at night. He has a lifelong history of insomnia and has difficulty getting to sleep. He notes he drinks on average about 6-8 alcoholic beverages a week usually on one night or 2. He tells me he eats a diet rich in vegetables and tries to have at least 2-4 servings a day. He has a history of gout and notes his hand hurt about 2 weeks ago and has been having some intermittent bilateral ankle pain. Review of Systems   Constitutional: Negative for chills and fever. HENT: Negative for ear pain, rhinorrhea and sore throat. Eyes: Negative for pain and visual disturbance. Respiratory: Negative for cough and shortness of breath. Cardiovascular: Negative for chest pain and palpitations. Gastrointestinal: Negative for abdominal pain, constipation, diarrhea, nausea and vomiting. Genitourinary: Negative for dysuria and frequency. Musculoskeletal: Positive for arthralgias. Negative for joint swelling and myalgias. Skin: Negative for color change and rash. Neurological: Negative for weakness, numbness and headaches. Hematological: Negative for adenopathy. Does not bruise/bleed easily. Psychiatric/Behavioral: Negative for dysphoric mood, self-injury and suicidal ideas. The patient is not nervous/anxious. No Known Allergies    Prior to Visit Medications    Medication Sig Taking?  Authorizing Provider   colchicine (COLCRYS) 0.6 MG tablet Take 1 tablet by mouth 2 times daily Yes AMI Alejandra - CNP       Past Medical History:   Diagnosis Date    Gout     Seborrheic dermatitis        Past Surgical History:   Procedure Laterality Date    ORTHOPEDIC SURGERY  1992    2ndary to [facility] RLE, scar tissue removal       Family History   Problem Relation Age of Onset    Cancer Father         Pancreatic    Diabetes Father        Social History     Tobacco Use    Smoking status: Never Smoker    Smokeless tobacco: Never Used   Vaping Use    Vaping Use: Never used   Substance Use Topics    Alcohol use: Yes     Comment: socially    Drug use: Never       Objective   BP (!) 137/91   Pulse 88   Temp 97.2 °F (36.2 °C)   Ht 5' 9\" (1.753 m)   Wt 243 lb (110.2 kg)   SpO2 96%   BMI 35.88 kg/m²   Wt Readings from Last 3 Encounters:   07/14/22 243 lb (110.2 kg)   06/28/22 247 lb (112 kg)   07/02/21 241 lb (109.3 kg)     There were no vitals filed for this visit. Physical Exam  Constitutional:       Appearance: He is well-developed. HENT:      Head: Normocephalic and atraumatic. Nose: Nose normal.      Mouth/Throat:      Pharynx: No oropharyngeal exudate. Eyes:      General: No scleral icterus. Right eye: No discharge. Left eye: No discharge. Pupils: Pupils are equal, round, and reactive to light. Neck:      Thyroid: No thyromegaly. Cardiovascular:      Rate and Rhythm: Normal rate and regular rhythm. Pulses:           Dorsalis pedis pulses are 2+ on the right side and 2+ on the left side. Posterior tibial pulses are 2+ on the right side and 2+ on the left side. Heart sounds: Normal heart sounds. No murmur heard. No friction rub. No gallop. Comments: No Edema Lower Extremities  Pulmonary:      Effort: Pulmonary effort is normal.      Breath sounds: Normal breath sounds. No wheezing or rales. Abdominal:      General: Bowel sounds are normal. There is no distension. Palpations: Abdomen is soft. There is no hepatomegaly or splenomegaly. Tenderness: There is no abdominal tenderness. There is no guarding or rebound.    Musculoskeletal:         General: No tenderness or deformity. Normal range of motion. Cervical back: Normal range of motion and neck supple. Lymphadenopathy:      Cervical: No cervical adenopathy. Skin:     General: Skin is warm and dry. Findings: No erythema or rash. Neurological:      Mental Status: He is alert. Cranial Nerves: No cranial nerve deficit. Sensory: No sensory deficit. Gait: Gait normal.   Psychiatric:         Speech: Speech normal.         Behavior: Behavior normal.           Assessment   Plan   1. Class 2 obesity due to excess calories without serious comorbidity with body mass index (BMI) of 36.0 to 36.9 in adult  Counseled on diet and exercise. Motivational interviewing and removal of barriers for good habits. 2. Chronic gout involving toe of left foot without tophus, unspecified cause  Patient states he has been working on diet and has been fairly well controlled. There is a possibility that this is the cause of his ankle pain. I offered him x-rays of his ankles today and he declined  - Uric Acid; Future    3. Class 2 obesity due to excess calories without serious comorbidity with body mass index (BMI) of 37.0 to 37.9 in adult  As above    4. Primary insomnia  Counseled on sleep hygiene. If this does not help, we will offer him trazodone    5. Elevated glucose  Screen  - Hemoglobin A1C - NOT COVERED /DO NOT USE FOR MEDICARE PATIENTS; Future    6. Screening cholesterol level  Historically elevated. We will recheck with labs today. He has not reached the level of requiring medications yet  - Lipid Panel; Future  - Comprehensive Metabolic Panel; Future    7. ACP (advance care planning)  Discussed with patient. Total time 5 minutes  - NM ADVANCED CARE PLAN FACE TO FACE, 1ST 30MIN [94995]    8. Encounter for well adult exam without abnormal findings  Appears well:  Counselled diet, development, anticipatory guidance and safety issues.       9. Need for prophylactic vaccination with tetanus-diphtheria (Td)  Vaccinated    10. Screening for colorectal cancer  Screen  - FIT-DNA (Cologuard)    11. Screening for HIV without presence of risk factors  Screen  - HIV Screen; Future    12. Need for hepatitis C screening test  Screen  - Hepatitis C Antibody; Future      Personalized Preventive Plan   Current Health Maintenance Status  Immunization History   Administered Date(s) Administered    COVID-19, PFIZER GRAY top, DO NOT Dilute, (age 15 y+), IM, 30 mcg/0.3 mL 03/04/2022    COVID-19, PFIZER PURPLE top, DILUTE for use, (age 15 y+), 30mcg/0.3mL 05/21/2021, 06/11/2021        Health Maintenance   Topic Date Due    HIV screen  Never done    Hepatitis C screen  Never done    DTaP/Tdap/Td vaccine (1 - Tdap) Never done    Diabetes screen  Never done    Colorectal Cancer Screen  Never done    Flu vaccine (1) 09/01/2022    Depression Screen  06/28/2023    Lipids  05/19/2026    COVID-19 Vaccine  Completed    Hepatitis A vaccine  Aged Out    Hepatitis B vaccine  Aged Out    Hib vaccine  Aged Out    Meningococcal (ACWY) vaccine  Aged Out    Pneumococcal 0-64 years Vaccine  Aged Out     Recommendations for [email or web address] Due: see orders and patient instructions/AVS.    Return if symptoms worsen or fail to improve.

## 2022-07-14 NOTE — PATIENT INSTRUCTIONS
Send me your Completed Living Will and/or [address] of  documents:  [address] MD  [address], [place]  Ph: [phone]  Fax: [phone]    WELL ADULT LIFESTYLE INSTRUCTIONS:    [place] a day in the next week to spend an hour reviewing the information below then:     1) determine your health goals for the year   2) determine what changes you need to achieve those goals   3) design your daily routine, shopping habits etc to implement those changes        Default Right Action (no choices)       Make it EASY to do the RIGHT THINGS. 4) I invite you to send me your plans via [employer] so I can continue to help you       with them    Examine your lifestyle with an emphasis on BARRIERS to bad and good habits and how you can design your life to make better choices. If you want to feel better these are the FUNDAMENTAL PILLARS of Wellness:    1)  You can choose to Get 150 min/week of moderate exercise (can talk but can't        sing) or 75 min/week of vigorous exercise (can't talk). This will enhance your sense of well being (Exercise is as good as medicine for   depression.)    2)  You can choose to Get 7-9 hours of sleep per night    Detoxifies your brain, reduces risk of dementia    3)  You can choose to Strength Train 2 x a week on non-consecutive days   This will improve function and reduce risk of injury. Body weight type exercises   such as Yoga and Pilates are excellent choices. 4)  You can choose Good Nutrition. Only eat your goal weight (in lbs) x 10        calories/day and get 5 servings of Vegetables/day   Plant based diets reduce risk of heart attack/stroke and will help you feel full on   less food. Avoid highly processed foods and processed carbohydrates. 5)  You can choose Moderate alcohol intake < 1-2 drinks/day   Alcohol will disrupt your sleep and add calories to your day.     6)  You can choose to Develop a Charismatic/Supportive relationship. This will strengthen your resilience for the ups and downs. 7)  You can choose to Practice Mindfulness. An hour a day of prayer/meditation/gratitude will change your life! If you are trying to lose weight, here are some recommendations for weight loss:  Not every weight loss program is appropriate for everybody. ..  good online sources include Noom (more social with daily check ins), Lifesum (similar but less social) and Naturally slim, as well as Brandneu ($1500)    The GI Diet or \"Primal diet\", Intermittent fasting can also be effective choices. If you have diabetes treated with insulin be sure to ask for specific guidance around meals. Take your desired weight in pounds and multiply by 10 and that is your average daily calorie allowance. For example if you wish to weigh 170 lb x 10 = 1700 everette/day (this is how to gradually lose the weight and maintain your desired weight). Avoid soda/coke and all \"wet carbs\" => Drink ice water instead    Drink a large glass of ice water before meals and EAT SLOWLY (talk while you eat)! Rethink your hunger => it means your losing weight. Minimize highly processed carbohydrates as they stimulate your appetite:  Specifically cut back on Bread, Rice, Pasta and Potatoes    Avoid eating calories after 6 pm      Patient Education        Insomnia: Care Instructions  Overview     Insomnia is the inability to sleep well. Insomnia may make it hard for you to get to sleep, stay asleep, or sleep as long as you need to. This can make you tired and grouchy during the day. It can also make you forgetful, lesseffective at work, and unhappy. Insomnia can be linked to many things. These include health problems,medicines, and stressful events. Treatment may include treating problems that may be linked with your insomnia. Treatment also includes behavior and lifestyle changes.  This may include cognitive-behavioral therapy for insomnia (CBT-I). CBT-I uses different ways to help you change your thoughts and behaviors that may interfere with sleep. Your doctor can recommend specific things you can try. Examples include doing relaxation exercises, keeping regular bedtimes and wake times, limiting alcohol, and making healthy sleep habits. Some people decide to take medicinefor a while to help with sleep. Follow-up care is a key part of your treatment and safety. Be sure to make and go to all appointments, and call your doctor if you are having problems. It's also a good idea to know your test results and keep alist of the medicines you take. How can you care for yourself at home? Cognitive-behavioral therapy for insomnia (CBT-I)   If your doctor recommends CBT-I, follow your treatment plan. Your doctor will give you instructions that are unique for you.  Your plan will likely include a few things that you can try at home. For example:  ? Try meditation or other relaxation techniques before you go to bed. ? Go to bed at the same time every night, and wake up at the same time every morning. Do not take naps during the day. ? Do not stay in bed awake for too long. If you can't fall asleep, or if you wake up in the middle of the night and can't get back to sleep within about 15 to 20 minutes, get out of bed and go to another room until you feel sleepy. ? If watching the clock makes you anxious, turn it facing away from you so you cannot see the time. ? If you worry when you lie down, start a worry book. Well before bedtime, write down your worries, and then set the book and your concerns aside. Healthy sleep habits   If your doctor recommends it, try making healthy sleep habits. For example:  ? Keep your bedroom quiet, dark, and cool. ? Do not have drinks with caffeine, such as coffee or black tea, for 8 hours before bed. ? Do not smoke or use other types of tobacco near bedtime.  Nicotine is a stimulant and can keep you awake.  ? Avoid drinking alcohol late in the evening, because it can cause you to wake in the middle of the night. ? Do not eat a big meal close to bedtime. If you are hungry, eat a light snack. ? Do not drink a lot of water close to bedtime, because the need to urinate may wake you up during the night. ? Do not read, watch TV, or use your phone in bed. Use the bed only for sleeping and sex. Medicine   Be safe with medicines. Take your medicines exactly as prescribed. Call your doctor if you think you are having a problem with your medicine.  Talk with your doctor before you try an over-the-counter medicine, herbal product, or supplement to try to improve your sleep. Your doctor can recommend how much to take and when to take it. Make sure your doctor knows all of the medicines, vitamins, herbal products, and supplements you take.  You will get more details on the specific medicines your doctor prescribes. When should you call for help? Watch closely for changes in your health, and be sure to contact your doctor if:     Your efforts to improve your sleep do not work.      Your insomnia gets worse.      You have been feeling down, depressed, or hopeless or have lost interest in things that you usually enjoy. Where can you learn more? Go to [email or web address] [email or web address] org and sign in to your [employer] account. Enter P513 in the Skypaz box to learn more about \"Insomnia: Care Instructions. \"     If you do not have an account, please click on the \"Sign Up Now\" link. Current as of: February 9, 2022               Content Version: 13.3  © 2006-2022 Healthwise, Incorporated. Care instructions adapted under license by [facility] [address] If you have questions about a medical condition or this instruction, always ask your healthcare professional. [place] [address] any warranty or liability for your use of this information.

## 2022-11-01 ENCOUNTER — HOSPITAL ENCOUNTER (OUTPATIENT)
Dept: GENERAL RADIOLOGY | Age: 47
Discharge: HOME | End: 2022-11-01
Payer: COMMERCIAL

## 2022-11-01 ENCOUNTER — OFFICE VISIT (OUTPATIENT)
Dept: PRIMARY CARE CLINIC | Age: 47
End: 2022-11-01
Payer: COMMERCIAL

## 2022-11-01 ENCOUNTER — HOSPITAL ENCOUNTER (OUTPATIENT)
Age: 47
Discharge: HOME | End: 2022-11-01
Payer: COMMERCIAL

## 2022-11-01 ENCOUNTER — NURSE TRIAGE (OUTPATIENT)
Dept: OTHER | Facility: CLINIC | Age: 47
End: 2022-11-01

## 2022-11-01 VITALS
HEIGHT: 69 IN | BODY MASS INDEX: 35.49 KG/M2 | WEIGHT: 239.6 LBS | SYSTOLIC BLOOD PRESSURE: 133 MMHG | HEART RATE: 91 BPM | TEMPERATURE: 97.9 F | DIASTOLIC BLOOD PRESSURE: 92 MMHG

## 2022-11-01 DIAGNOSIS — M10.041 ACUTE IDIOPATHIC GOUT OF RIGHT HAND: ICD-10-CM

## 2022-11-01 DIAGNOSIS — M25.571 ACUTE RIGHT ANKLE PAIN: ICD-10-CM

## 2022-11-01 DIAGNOSIS — M79.671 ACUTE PAIN OF RIGHT FOOT: ICD-10-CM

## 2022-11-01 DIAGNOSIS — M79.671 ACUTE PAIN OF RIGHT FOOT: Primary | ICD-10-CM

## 2022-11-01 PROCEDURE — 99214 OFFICE O/P EST MOD 30 MIN: CPT | Performed by: FAMILY MEDICINE

## 2022-11-01 PROCEDURE — 73630 X-RAY EXAM OF FOOT: CPT

## 2022-11-01 PROCEDURE — 73610 X-RAY EXAM OF ANKLE: CPT

## 2022-11-01 RX ORDER — COLCHICINE 0.6 MG/1
0.6 TABLET ORAL 2 TIMES DAILY
Qty: 60 TABLET | Refills: 0 | Status: SHIPPED | OUTPATIENT
Start: 2022-11-01

## 2022-11-01 RX ORDER — NAPROXEN 500 MG/1
500 TABLET ORAL 2 TIMES DAILY WITH MEALS
Qty: 60 TABLET | Refills: 5 | Status: SHIPPED | OUTPATIENT
Start: 2022-11-01

## 2022-11-01 ASSESSMENT — ENCOUNTER SYMPTOMS
WHEEZING: 0
SHORTNESS OF BREATH: 0
EYE REDNESS: 0
ABDOMINAL PAIN: 0
CHEST TIGHTNESS: 0
DIARRHEA: 0
COUGH: 0

## 2022-11-01 NOTE — TELEPHONE ENCOUNTER
Location of patient: [facility] [place] call from [facility] at [facility] with [facility]. Subjective: Caller states \"I have a weird burning sensation in my foot\"     Current Symptoms: R foot has been painful for the past 2 weeks. Pt is on crutches and having difficulty bearing weight. Denies injury. Patient is feeling pain in ankle, bottom of foot and top of foot. Reports has a history of gout, but does not feel like gout. Onset: 2 weeks    Associated Symptoms: Foot pain. \"Feels a lot like what I saw Dr. Paul Rivas for in May of 2001\"    Pain Severity: Severe pain when bearing weight. When all weight is off rates pain 1/10    Temperature: Denies pain    What has been tried: Advil    LMP: NA Pregnant: NA    Recommended disposition: See today in office     Care advice provided, patient verbalizes understanding; denies any other questions or concerns; instructed to call back for any new or worsening symptoms. Transferred to [facility] for scheduling    Attention Provider: Thank you for allowing me to participate in the care of your patient. The patient was connected to triage in response to information provided to the [facility]. Please do not respond through this encounter as the response is not directed to a shared pool. Reason for Disposition   SEVERE pain (e.g., excruciating, unable to do any normal activities)    Protocols used:  Foot Pain-ADULT-[place]
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
## 2023-06-03 ENCOUNTER — APPOINTMENT (OUTPATIENT)
Dept: GENERAL RADIOLOGY | Age: 48
End: 2023-06-03
Payer: COMMERCIAL

## 2023-06-03 ENCOUNTER — HOSPITAL ENCOUNTER (EMERGENCY)
Age: 48
Discharge: HOME | End: 2023-06-03
Attending: EMERGENCY MEDICINE
Payer: COMMERCIAL

## 2023-06-03 VITALS
DIASTOLIC BLOOD PRESSURE: 92 MMHG | BODY MASS INDEX: 37.49 KG/M2 | OXYGEN SATURATION: 96 % | HEIGHT: 68 IN | WEIGHT: 247.36 LBS | SYSTOLIC BLOOD PRESSURE: 138 MMHG | TEMPERATURE: 97 F | HEART RATE: 95 BPM | RESPIRATION RATE: 20 BRPM

## 2023-06-03 DIAGNOSIS — M25.531 ACUTE PAIN OF RIGHT WRIST: Primary | ICD-10-CM

## 2023-06-03 LAB
ALBUMIN SERPL-MCNC: 4.4 G/DL (ref 3.4–5)
ALBUMIN/GLOB SERPL: 1.5 {RATIO} (ref 1.1–2.2)
ALP SERPL-CCNC: 68 U/L (ref 40–129)
ALT SERPL-CCNC: 28 U/L (ref 10–40)
ANION GAP SERPL CALCULATED.3IONS-SCNC: 13 MMOL/L (ref 3–16)
AST SERPL-CCNC: 20 U/L (ref 15–37)
BASOPHILS # BLD: 0.1 K/UL (ref 0–0.2)
BASOPHILS NFR BLD: 0.8 %
BILIRUB SERPL-MCNC: 0.6 MG/DL (ref 0–1)
BUN SERPL-MCNC: 17 MG/DL (ref 7–20)
CALCIUM SERPL-MCNC: 9.2 MG/DL (ref 8.3–10.6)
CHLORIDE SERPL-SCNC: 102 MMOL/L (ref 99–110)
CO2 SERPL-SCNC: 22 MMOL/L (ref 21–32)
CREAT SERPL-MCNC: 0.9 MG/DL (ref 0.9–1.3)
CRP SERPL-MCNC: 29.7 MG/L (ref 0–5.1)
DEPRECATED RDW RBC AUTO: 12.3 % (ref 12.4–15.4)
EOSINOPHIL # BLD: 0.2 K/UL (ref 0–0.6)
EOSINOPHIL NFR BLD: 2.1 %
ERYTHROCYTE [SEDIMENTATION RATE] IN BLOOD BY WESTERGREN METHOD: 35 MM/HR (ref 0–15)
GFR SERPLBLD CREATININE-BSD FMLA CKD-EPI: >60 ML/MIN/{1.73_M2}
GLUCOSE SERPL-MCNC: 123 MG/DL (ref 70–99)
HCT VFR BLD AUTO: 42.5 % (ref 40.5–52.5)
HGB BLD-MCNC: 14.9 G/DL (ref 13.5–17.5)
LYMPHOCYTES # BLD: 1.4 K/UL (ref 1–5.1)
LYMPHOCYTES NFR BLD: 13.6 %
MCH RBC QN AUTO: 33.1 PG (ref 26–34)
MCHC RBC AUTO-ENTMCNC: 35 G/DL (ref 31–36)
MCV RBC AUTO: 94.5 FL (ref 80–100)
MONOCYTES # BLD: 1.1 K/UL (ref 0–1.3)
MONOCYTES NFR BLD: 10.9 %
NEUTROPHILS # BLD: 7.6 K/UL (ref 1.7–7.7)
NEUTROPHILS NFR BLD: 72.6 %
PLATELET # BLD AUTO: 244 K/UL (ref 135–450)
PMV BLD AUTO: 8.6 FL (ref 5–10.5)
POTASSIUM SERPL-SCNC: 4.6 MMOL/L (ref 3.5–5.1)
PROT SERPL-MCNC: 7.3 G/DL (ref 6.4–8.2)
RBC # BLD AUTO: 4.5 M/UL (ref 4.2–5.9)
SODIUM SERPL-SCNC: 137 MMOL/L (ref 136–145)
URATE SERPL-MCNC: 7.7 MG/DL (ref 3.5–7.2)
WBC # BLD AUTO: 10.5 K/UL (ref 4–11)

## 2023-06-03 PROCEDURE — 73130 X-RAY EXAM OF HAND: CPT

## 2023-06-03 PROCEDURE — 84550 ASSAY OF BLOOD/URIC ACID: CPT

## 2023-06-03 PROCEDURE — 96374 THER/PROPH/DIAG INJ IV PUSH: CPT

## 2023-06-03 PROCEDURE — 99284 EMERGENCY DEPT VISIT MOD MDM: CPT

## 2023-06-03 PROCEDURE — 73110 X-RAY EXAM OF WRIST: CPT

## 2023-06-03 PROCEDURE — 6360000002 HC RX W HCPCS: Performed by: EMERGENCY MEDICINE

## 2023-06-03 PROCEDURE — 6370000000 HC RX 637 (ALT 250 FOR IP): Performed by: EMERGENCY MEDICINE

## 2023-06-03 PROCEDURE — 85025 COMPLETE CBC W/AUTO DIFF WBC: CPT

## 2023-06-03 PROCEDURE — 80053 COMPREHEN METABOLIC PANEL: CPT

## 2023-06-03 PROCEDURE — 86140 C-REACTIVE PROTEIN: CPT

## 2023-06-03 PROCEDURE — 85652 RBC SED RATE AUTOMATED: CPT

## 2023-06-03 RX ORDER — COLCHICINE 0.6 MG/1
0.6 TABLET ORAL 2 TIMES DAILY
Qty: 14 TABLET | Refills: 0 | Status: SHIPPED | OUTPATIENT
Start: 2023-06-03 | End: 2023-06-10

## 2023-06-03 RX ORDER — IBUPROFEN 400 MG/1
800 TABLET ORAL ONCE
Status: COMPLETED | OUTPATIENT
Start: 2023-06-03 | End: 2023-06-03

## 2023-06-03 RX ORDER — KETOROLAC TROMETHAMINE 30 MG/ML
30 INJECTION, SOLUTION INTRAMUSCULAR; INTRAVENOUS ONCE
Status: COMPLETED | OUTPATIENT
Start: 2023-06-03 | End: 2023-06-03

## 2023-06-03 RX ORDER — COLCHICINE 0.6 MG/1
0.6 TABLET ORAL DAILY
Status: DISCONTINUED | OUTPATIENT
Start: 2023-06-03 | End: 2023-06-03

## 2023-06-03 RX ORDER — COLCHICINE 0.6 MG/1
0.6 TABLET ORAL ONCE
Status: COMPLETED | OUTPATIENT
Start: 2023-06-03 | End: 2023-06-03

## 2023-06-03 RX ADMIN — COLCHICINE 0.6 MG: 0.6 TABLET ORAL at 06:07

## 2023-06-03 RX ADMIN — KETOROLAC TROMETHAMINE 30 MG: 30 INJECTION, SOLUTION INTRAMUSCULAR; INTRAVENOUS at 04:36

## 2023-06-03 RX ADMIN — IBUPROFEN 800 MG: 400 TABLET, FILM COATED ORAL at 06:06

## 2023-06-03 ASSESSMENT — PAIN - FUNCTIONAL ASSESSMENT: PAIN_FUNCTIONAL_ASSESSMENT: 0-10

## 2023-06-03 ASSESSMENT — PAIN DESCRIPTION - LOCATION
LOCATION: HAND;WRIST
LOCATION: HAND

## 2023-06-03 ASSESSMENT — PAIN DESCRIPTION - ORIENTATION
ORIENTATION: RIGHT
ORIENTATION: RIGHT

## 2023-06-03 ASSESSMENT — PAIN SCALES - GENERAL
PAINLEVEL_OUTOF10: 8
PAINLEVEL_OUTOF10: 8

## 2023-06-03 NOTE — ED NOTES
D/C: Order noted for d/c. Pt confirmed d/c paperwork  have correct name. Discharge and education instructions reviewed with patient. Teach-back successful. Pt verbalized understanding and signed d/c papers. Pt denied questions at this time. No acute distress noted. Patient instructed to follow-up as noted - return to emergency department if symptoms worsen. Patient verbalized understanding. Discharged per EDMD with discharge instructions. Pt discharged  to private vehicle. Patient stable upon departure. Thanked patient for choosing [facility]) for care.           Sergio Caceres RN  06/03/23 4803

## 2023-06-03 NOTE — ED PROVIDER NOTES
[facility]      Pt Name: Mora De Paz  MRN: [id number]  [place] 1975  Date of evaluation: 6/3/2023  Provider: Mariana Katz, [address] [place] [address]  Chief Complaint   Patient presents with    Hand Pain     Right wrist and hand pain x2 days. No known injury. Patient state increased swelling overnight. Taking Advil at home with no relief. This patient is at risk for a communicable infection. Therefore, personal protection equipment consisting of a mask was worn for the exam.    HPI  Mora De Paz is a 52 y.o. male who presents with right wrist and hand pain has been present for 2 days. He has a history of gout. He thinks this feels like gout. He denies history of diabetes. Denies any fevers or chills. Movement makes it worse and rest makes it better. Describes the pain as sharp and severe. He states it increases with movement. He denies any radiation of his pain. He states he is never had gout affected his wrist before. ? REVIEW OF SYSTEMS  All systems negative except as noted in the HPI. Reviewed Nurses' notes and concur. No LMP for male patient. PAST MEDICAL HISTORY  Past Medical History:   Diagnosis Date    Gout     Seborrheic dermatitis        FAMILY HISTORY  Family History   Problem Relation Age of Onset    Cancer Father         Pancreatic    Diabetes Father        SOCIAL HISTORY   reports that he has never smoked. He has never used smokeless tobacco. He reports current alcohol use. He reports that he does not use drugs.     SURGICAL HISTORY  Past Surgical History:   Procedure Laterality Date    ORTHOPEDIC SURGERY  1992    2ndary to [facility], scar tissue removal       CURRENT MEDICATIONS  Current Outpatient Rx   Medication Sig Dispense Refill    colchicine (COLCRYS) 0.6 MG tablet Take 1 tablet by mouth 2 times daily for 7 days 14 tablet 0    naproxen (NAPROSYN) 500 MG tablet Take 1 tablet by mouth 2 times

## 2023-08-13 ENCOUNTER — OFFICE VISIT (OUTPATIENT)
Age: 48
End: 2023-08-13

## 2023-08-13 VITALS
WEIGHT: 236 LBS | DIASTOLIC BLOOD PRESSURE: 80 MMHG | TEMPERATURE: 98.6 F | SYSTOLIC BLOOD PRESSURE: 112 MMHG | HEART RATE: 94 BPM | OXYGEN SATURATION: 97 % | BODY MASS INDEX: 35.88 KG/M2

## 2023-08-13 DIAGNOSIS — M10.9 GOUT INVOLVING TOE OF LEFT FOOT, UNSPECIFIED CAUSE, UNSPECIFIED CHRONICITY: Primary | ICD-10-CM

## 2023-08-13 RX ORDER — COLCHICINE 0.6 MG/1
0.6 TABLET ORAL DAILY
Qty: 30 TABLET | Refills: 1 | Status: SHIPPED | OUTPATIENT
Start: 2023-08-13

## 2023-08-13 RX ORDER — COLCHICINE 0.6 MG/1
0.6 TABLET ORAL 2 TIMES DAILY
Qty: 14 TABLET | Refills: 2 | Status: SHIPPED | OUTPATIENT
Start: 2023-08-13 | End: 2023-08-13 | Stop reason: CLARIF

## 2023-08-13 ASSESSMENT — ENCOUNTER SYMPTOMS: COLOR CHANGE: 1

## 2023-09-18 ENCOUNTER — NURSE TRIAGE (OUTPATIENT)
Dept: OTHER | Facility: CLINIC | Age: 48
End: 2023-09-18

## 2023-09-18 NOTE — TELEPHONE ENCOUNTER
Location of patient: [address] call from [place] at [facility]; Patient with [employer] Complaint requesting to establish care with [facility]. Subjective: Caller states \"right wrist pain slight swelling\"     Current Symptoms: right wrist pain, slight swelling. Denies injury, numbness, tingling    Onset: 5 days ago; sudden, unchanged    Associated Symptoms:  doing normal activity, just less with right wrist    Pain Severity: 4-8/10; sharp, throbbing; constant, waxing and waning    Temperature: none     What has been tried: wrist brace, aleve or advil, ice    LMP: NA Pregnant: NA    Recommended disposition: See PCP within 3 Days    [facility] or walk in clinic if no appt. Care advice provided, patient verbalizes understanding; denies any other questions or concerns; instructed to call back for any new or worsening symptoms. Patient/Caller agrees with recommended disposition; writer provided warm transfer to [place] at [facility] for appointment scheduling    Attention Provider: Thank you for allowing me to participate in the care of your patient. The patient was connected to triage in response to information provided to the [facility]. Please do not respond through this encounter as the response is not directed to a shared pool.         Reason for Disposition   MODERATE pain (e.g., interferes with normal activities) and present > 3 days    Protocols used: Hand and Wrist Pain-ADULT-[place]

## 2023-09-21 ENCOUNTER — OFFICE VISIT (OUTPATIENT)
Dept: FAMILY MEDICINE CLINIC | Age: 48
End: 2023-09-21
Payer: COMMERCIAL

## 2023-09-21 VITALS
SYSTOLIC BLOOD PRESSURE: 116 MMHG | DIASTOLIC BLOOD PRESSURE: 74 MMHG | HEART RATE: 93 BPM | HEIGHT: 68 IN | BODY MASS INDEX: 35.55 KG/M2 | OXYGEN SATURATION: 96 % | WEIGHT: 234.6 LBS

## 2023-09-21 DIAGNOSIS — Z00.00 ENCOUNTER FOR WELL ADULT EXAM WITHOUT ABNORMAL FINDINGS: Primary | ICD-10-CM

## 2023-09-21 DIAGNOSIS — M79.641 RIGHT HAND PAIN: ICD-10-CM

## 2023-09-21 DIAGNOSIS — Z11.59 NEED FOR HEPATITIS C SCREENING TEST: ICD-10-CM

## 2023-09-21 DIAGNOSIS — Z13.220 SCREENING CHOLESTEROL LEVEL: ICD-10-CM

## 2023-09-21 DIAGNOSIS — Z11.4 SCREENING FOR HIV (HUMAN IMMUNODEFICIENCY VIRUS): ICD-10-CM

## 2023-09-21 DIAGNOSIS — Z13.1 SCREENING FOR DIABETES MELLITUS: ICD-10-CM

## 2023-09-21 PROCEDURE — 99396 PREV VISIT EST AGE 40-64: CPT | Performed by: NURSE PRACTITIONER

## 2023-09-21 SDOH — ECONOMIC STABILITY: HOUSING INSECURITY
IN THE LAST 12 MONTHS, WAS THERE A TIME WHEN YOU DID NOT HAVE A STEADY PLACE TO SLEEP OR SLEPT IN A SHELTER (INCLUDING NOW)?: NO

## 2023-09-21 SDOH — ECONOMIC STABILITY: FOOD INSECURITY: WITHIN THE PAST 12 MONTHS, YOU WORRIED THAT YOUR FOOD WOULD RUN OUT BEFORE YOU GOT MONEY TO BUY MORE.: NEVER TRUE

## 2023-09-21 SDOH — ECONOMIC STABILITY: INCOME INSECURITY: HOW HARD IS IT FOR YOU TO PAY FOR THE VERY BASICS LIKE FOOD, HOUSING, MEDICAL CARE, AND HEATING?: NOT HARD AT ALL

## 2023-09-21 SDOH — ECONOMIC STABILITY: FOOD INSECURITY: WITHIN THE PAST 12 MONTHS, THE FOOD YOU BOUGHT JUST DIDN'T LAST AND YOU DIDN'T HAVE MONEY TO GET MORE.: NEVER TRUE

## 2023-09-21 ASSESSMENT — PATIENT HEALTH QUESTIONNAIRE - PHQ9
SUM OF ALL RESPONSES TO PHQ QUESTIONS 1-9: 0
2. FEELING DOWN, DEPRESSED OR HOPELESS: 0
1. LITTLE INTEREST OR PLEASURE IN DOING THINGS: 0
SUM OF ALL RESPONSES TO PHQ QUESTIONS 1-9: 0
SUM OF ALL RESPONSES TO PHQ QUESTIONS 1-9: 0
SUM OF ALL RESPONSES TO PHQ9 QUESTIONS 1 & 2: 0
SUM OF ALL RESPONSES TO PHQ QUESTIONS 1-9: 0

## 2023-09-21 NOTE — PROGRESS NOTES
symptoms. Musculoskeletal: Normal range of motion, no synovitis. He exhibits no edema. Neurological: He is alert and oriented to person, place, and time. He has normal reflexes. No cranial nerve deficit. Coordination normal.   Skin: Skin is warm, dry and intact. No suspicious lesions are noted. Psychiatric: He has a normal mood and affect. His speech is normal and behavior is normal. Judgment, cognition and memory are normal.     Assessment/Plan:    Jenny Aquino was seen today for wrist pain. Diagnoses and all orders for this visit:    Encounter for well adult exam without abnormal findings    Screening for diabetes mellitus  -     Hemoglobin A1C; Future    Screening cholesterol level  Not fasting today. Will come back. -     Lipid Panel; Future    Right hand pain  Most likely carpal tunnel. Patient's dominant hand. Checking EMG.     -     AFL - Amelia Pettit MD, Neurology (EMG, NCV), [facility]    Need for hepatitis C screening test  -     Hepatitis C Antibody; Future    Screening for HIV (human immunodeficiency virus)  -     HIV Screen; Future    HM: He has a cologuard at home. Encouraged to complete and send back. Declines flu vaccine.

## 2023-09-21 NOTE — PATIENT INSTRUCTIONS
[address] Layne Zamora MD  [address] [place] [address]., [address] [facility], [address] [place] [address] Drive  Phone: [phone]

## 2023-12-27 ENCOUNTER — TELEPHONE (OUTPATIENT)
Dept: FAMILY MEDICINE CLINIC | Age: 48
End: 2023-12-27

## 2023-12-27 DIAGNOSIS — M10.9 GOUT INVOLVING TOE OF LEFT FOOT, UNSPECIFIED CAUSE, UNSPECIFIED CHRONICITY: ICD-10-CM

## 2023-12-27 RX ORDER — COLCHICINE 0.6 MG/1
0.6 TABLET ORAL DAILY
Qty: 30 TABLET | Refills: 1 | Status: SHIPPED | OUTPATIENT
Start: 2023-12-27

## 2023-12-27 NOTE — TELEPHONE ENCOUNTER
Pt called stating he has a gout flare up in both feet and would like a script  for colchicine (COLCRYS) 0.6 MG tablet to be called into [facility] on [address].  Pt states he can not even walk with crutches it is so bad and he would like to get this filled asap

## 2023-12-27 NOTE — TELEPHONE ENCOUNTER
Prescription sent. For flare patient should take 2 tabs then 1 tab one hour later. Please document call and then close encounter.   thanks

## 2024-05-09 ENCOUNTER — OFFICE VISIT (OUTPATIENT)
Dept: FAMILY MEDICINE CLINIC | Age: 49
End: 2024-05-09
Payer: COMMERCIAL

## 2024-05-09 VITALS
HEART RATE: 98 BPM | SYSTOLIC BLOOD PRESSURE: 118 MMHG | TEMPERATURE: 97.8 F | WEIGHT: 244 LBS | BODY MASS INDEX: 36.14 KG/M2 | OXYGEN SATURATION: 97 % | DIASTOLIC BLOOD PRESSURE: 78 MMHG | HEIGHT: 69 IN

## 2024-05-09 DIAGNOSIS — M76.32 IT BAND SYNDROME, LEFT: ICD-10-CM

## 2024-05-09 DIAGNOSIS — M25.562 ACUTE PAIN OF LEFT KNEE: Primary | ICD-10-CM

## 2024-05-09 PROCEDURE — 99213 OFFICE O/P EST LOW 20 MIN: CPT | Performed by: NURSE PRACTITIONER

## 2024-05-09 ASSESSMENT — PATIENT HEALTH QUESTIONNAIRE - PHQ9
SUM OF ALL RESPONSES TO PHQ QUESTIONS 1-9: 0
1. LITTLE INTEREST OR PLEASURE IN DOING THINGS: NOT AT ALL
SUM OF ALL RESPONSES TO PHQ QUESTIONS 1-9: 0
SUM OF ALL RESPONSES TO PHQ9 QUESTIONS 1 & 2: 0
SUM OF ALL RESPONSES TO PHQ QUESTIONS 1-9: 0
SUM OF ALL RESPONSES TO PHQ QUESTIONS 1-9: 0
2. FEELING DOWN, DEPRESSED OR HOPELESS: NOT AT ALL

## 2024-05-09 NOTE — PROGRESS NOTES
PROGRESS NOTE     Gisselle Lew CNP  [facility]  [address] [place] [address], [address]  [facility] [id number] [phone] office  [phone] fax    Date of Service:  5/9/2024    Assessment / Plan:     1. Acute pain of left knee  Referral if pain does not improve with exercises.     - Deric Torres MD, Orthopedics and Sports Medicine (Knee; Shoulder), [facility]    2. It band syndrome, left  Suspect IT band. Printed out information and exercises.     Subjective:      Patient ID: .Sam Cardona is a 48 y.o. male      CC: Left knee pain    HPI  Patient reports 1 week history of left lateral knee pain. He does not remember any specific injury. He was working out in his yard and might have strained it then.     He has been taking advil, wearing a brace, icing and resting. It does feel a bit better today but he is still using once crutch.       Vitals:    05/09/24 1118   BP: 118/78   Site: Left Upper Arm   Position: Sitting   Cuff Size: Large Adult   Pulse: 98   Temp: 97.8 °F (36.6 °C)   SpO2: 97%   Weight: 110.7 kg (244 lb)   Height: 1.753 m (5' 9\")       Outpatient Medications Marked as Taking for the 5/9/24 encounter (Office Visit) with Gisselle Lew APRN - CNP   Medication Sig Dispense Refill    colchicine (COLCRYS) 0.6 MG tablet Take 1 tablet by mouth daily 30 tablet 1       Past Medical History:   Diagnosis Date    Gout     Seborrheic dermatitis        Past Surgical History:   Procedure Laterality Date    ORTHOPEDIC SURGERY  1992    2ndary to [facility] RLE, scar tissue removal       Social History     Tobacco Use    Smoking status: Never    Smokeless tobacco: Never   Substance Use Topics    Alcohol use: Yes     Comment: socially       Family History   Problem Relation Age of Onset    Cancer Father         Pancreatic    Diabetes Father            Review of Systems  Constitutional:  Negative for activity or appetite change, fever or fatigue  HENT:  Negative for congestion,sinus pressure, or

## 2024-06-22 ENCOUNTER — OFFICE VISIT (OUTPATIENT)
Age: 49
End: 2024-06-22

## 2024-06-22 VITALS
DIASTOLIC BLOOD PRESSURE: 69 MMHG | RESPIRATION RATE: 18 BRPM | HEART RATE: 96 BPM | TEMPERATURE: 98.4 F | WEIGHT: 252 LBS | BODY MASS INDEX: 37.21 KG/M2 | SYSTOLIC BLOOD PRESSURE: 137 MMHG | OXYGEN SATURATION: 97 %

## 2024-06-22 DIAGNOSIS — J02.9 SORE THROAT: ICD-10-CM

## 2024-06-22 DIAGNOSIS — J01.90 ACUTE SINUSITIS, RECURRENCE NOT SPECIFIED, UNSPECIFIED LOCATION: Primary | ICD-10-CM

## 2024-06-22 LAB — S PYO AG THROAT QL: NORMAL

## 2024-06-22 RX ORDER — AZITHROMYCIN 250 MG/1
TABLET, FILM COATED ORAL
Qty: 6 TABLET | Refills: 0 | Status: SHIPPED | OUTPATIENT
Start: 2024-06-22 | End: 2024-07-02

## 2024-06-22 ASSESSMENT — ENCOUNTER SYMPTOMS
ABDOMINAL PAIN: 0
COUGH: 0
CHEST TIGHTNESS: 0
CONSTIPATION: 0
SORE THROAT: 1
NAUSEA: 0
SHORTNESS OF BREATH: 0
VOMITING: 0
DIARRHEA: 0

## 2024-06-22 NOTE — PROGRESS NOTES
Sam Cardona (:  1975) is a 48 y.o. male,Established Patient, here for evaluation of the following chief complaint(s):  Otalgia (In both ears, worse in right ear 3 days symptoms. Left ear pain started today.), Pharyngitis (1 week ago symptoms), and Cough (1 week symptoms )      ASSESSMENT/PLAN:    ICD-10-CM    1. Acute sinusitis, recurrence not specified, unspecified location  J01.90 azithromycin (ZITHROMAX) 250 MG tablet      2. Sore throat  J02.9 POCT rapid strep A          Patient presents for cough, sore throat, ear pressure.  POCT strep: negative  DDX: Sinusitis vs seasonal allergies  Rx: azithormycin as symptoms ongoing 7 days with worsening symptoms  Patient on colchicine, however, not currently taking. Advised that he should not take azithromycin and colchicine together.   Follow up with PCP in 5-7 days if symptoms persist or if symptoms worsen.    SUBJECTIVE/OBJECTIVE:    History provided by:  Patient   used: No      HPI:   48 y.o. male presents with symptoms of bilateral ear pressure and ringing and sore throat and cough ongoing for over one week.  NO measured fevers. No known sick contacts. Has taken advil for symptoms. No drainage from ears. Cough is dry non-productive. Not a smoker. He did take anti-allergy medications as well with minimal relief. Not a current smoker; does not have asthma, not on any inhalers.     Vitals:    24 1315 24 1320   BP: (!) 150/90 137/69   Site: Left Upper Arm Right Upper Arm   Position: Sitting Sitting   Cuff Size: Large Adult Large Adult   Pulse: (!) 101 96   Resp:  18   Temp: 98.4 °F (36.9 °C) 98.4 °F (36.9 °C)   TempSrc: Oral Oral   SpO2: 93% 97%   Weight: 114.7 kg (252 lb 12.8 oz) 114.3 kg (252 lb)       Review of Systems   Constitutional:  Negative for chills, diaphoresis, fatigue and fever.   HENT:  Positive for congestion, ear pain and sore throat. Negative for ear discharge.    Respiratory:  Negative for cough, chest

## 2024-06-27 ENCOUNTER — OFFICE VISIT (OUTPATIENT)
Dept: FAMILY MEDICINE CLINIC | Age: 49
End: 2024-06-27
Payer: COMMERCIAL

## 2024-06-27 VITALS
WEIGHT: 247 LBS | DIASTOLIC BLOOD PRESSURE: 68 MMHG | OXYGEN SATURATION: 96 % | TEMPERATURE: 98.1 F | SYSTOLIC BLOOD PRESSURE: 108 MMHG | HEIGHT: 68 IN | HEART RATE: 100 BPM | BODY MASS INDEX: 37.44 KG/M2

## 2024-06-27 DIAGNOSIS — M25.531 RIGHT WRIST PAIN: Primary | ICD-10-CM

## 2024-06-27 DIAGNOSIS — H66.92 LEFT OTITIS MEDIA, UNSPECIFIED OTITIS MEDIA TYPE: ICD-10-CM

## 2024-06-27 PROCEDURE — 99213 OFFICE O/P EST LOW 20 MIN: CPT | Performed by: NURSE PRACTITIONER

## 2024-06-27 RX ORDER — AMOXICILLIN AND CLAVULANATE POTASSIUM 875; 125 MG/1; MG/1
1 TABLET, FILM COATED ORAL 2 TIMES DAILY
Qty: 14 TABLET | Refills: 0 | Status: SHIPPED | OUTPATIENT
Start: 2024-06-27 | End: 2024-07-04

## 2024-06-27 RX ORDER — PREDNISONE 20 MG/1
20 TABLET ORAL 2 TIMES DAILY
Qty: 10 TABLET | Refills: 0 | Status: SHIPPED | OUTPATIENT
Start: 2024-06-27 | End: 2024-07-02

## 2024-06-27 NOTE — PROGRESS NOTES
PROGRESS NOTE     Gisselle Lew CNP  [facility]  [address] [place] [address], [address]  [place] [address]  [phone] office  [phone] fax    Date of Service:  2024    Assessment / Plan:     1. Right wrist pain  Differentials include gout, carpal tunnel, tenosynovitis. Treating with steroids.     - predniSONE (DELTASONE) 20 MG tablet; Take 1 tablet by mouth 2 times daily for 5 days  Dispense: 10 tablet; Refill: 0  - Avinash Tcuker MD, Hand Surgery (Hand, Wrist, Upper Extremity), [facility]    2. Left otitis media, unspecified otitis media type  Treating with augmentin.     - amoxicillin-clavulanate (AUGMENTIN) 875-125 MG per tablet; Take 1 tablet by mouth 2 times daily for 7 days  Dispense: 14 tablet; Refill: 0    Subjective:      Patient ID: Delfino Cardona is a 48 y.o. male      CC: Right hand pain and swelling    HPI  Patient complains of a 9 day history of right hand pain. Swelling started about 6 days ago. He has pain and numbness to his first 4 digits into his hand and wrist.     He is taking ibuprofen 1000 mg BID and icing his right hand.     No injury.     He was seen by Dr. Hull back in  for tenosynovitis of right wrist with possible early carpal tunnel.     He has had gout in the past but this does not feel the same.     Patient had EMG in March which showed mild carpel tunnel syndrome.     Was recently given Zpak at urgent care for sinus and ear issues. Still having ear pain.       Vitals:    24 1501   BP: 108/68   Site: Left Upper Arm   Position: Sitting   Cuff Size: Large Adult   Pulse: 100   Temp: 98.1 °F (36.7 °C)   SpO2: 96%   Weight: 112 kg (247 lb)   Height: 1.727 m (5' 8\")       Outpatient Medications Marked as Taking for the 24 encounter (Office Visit) with Gisselle Lew APRN - CNP   Medication Sig Dispense Refill    [] predniSONE (DELTASONE) 20 MG tablet Take 1 tablet by mouth 2 times daily for 5 days 10 tablet 0    []

## 2024-07-01 DIAGNOSIS — H66.92 LEFT OTITIS MEDIA, UNSPECIFIED OTITIS MEDIA TYPE: ICD-10-CM

## 2024-07-01 DIAGNOSIS — M25.531 RIGHT WRIST PAIN: ICD-10-CM

## 2024-07-01 RX ORDER — PREDNISONE 20 MG/1
20 TABLET ORAL 2 TIMES DAILY
Qty: 10 TABLET | Refills: 0 | OUTPATIENT
Start: 2024-07-01 | End: 2024-07-06

## 2024-07-01 RX ORDER — AMOXICILLIN AND CLAVULANATE POTASSIUM 875; 125 MG/1; MG/1
1 TABLET, FILM COATED ORAL 2 TIMES DAILY
Qty: 14 TABLET | Refills: 0 | OUTPATIENT
Start: 2024-07-01 | End: 2024-07-08

## 2024-07-01 NOTE — TELEPHONE ENCOUNTER
Medication:   Requested Prescriptions     Pending Prescriptions Disp Refills    predniSONE (DELTASONE) 20 MG tablet 10 tablet 0     Sig: Take 1 tablet by mouth 2 times daily for 5 days    amoxicillin-clavulanate (AUGMENTIN) 875-125 MG per tablet 14 tablet 0     Sig: Take 1 tablet by mouth 2 times daily for 7 days       Last Filled:  6/27/2024    Patient Phone Number: [phone] (home)     Last appt: 6/27/2024   Next appt: Visit date not found

## 2024-07-01 NOTE — TELEPHONE ENCOUNTER
Pt called in stating that he needed a refill of his predniSONE (DELTASONE) 20 MG tablet and amoxicillin-clavulanate (AUGMENTIN) 875-125 MG per tablet  To be [facility] into [facility]

## 2024-07-03 DIAGNOSIS — H66.92 LEFT OTITIS MEDIA, UNSPECIFIED OTITIS MEDIA TYPE: ICD-10-CM

## 2024-07-03 DIAGNOSIS — M25.531 RIGHT WRIST PAIN: ICD-10-CM

## 2024-07-03 NOTE — TELEPHONE ENCOUNTER
Medication:   Requested Prescriptions     Pending Prescriptions Disp Refills    predniSONE (DELTASONE) 20 MG tablet [Pharmacy Med Name: PREDNISONE 20MG TABLETS] 10 tablet 0     Sig: TAKE 1 TABLET BY MOUTH TWICE DAILY FOR 5 DAYS    amoxicillin-clavulanate (AUGMENTIN) 875-125 MG per tablet [Pharmacy Med Name: AMOX-CLAV 875-125MG TABLETS] 14 tablet 0     Sig: TAKE 1 TABLET BY MOUTH TWICE DAILY FOR 7 DAYS       Last Filled:  6/27/2024    Patient Phone Number: [phone] (home)     Last appt: 6/27/2024   Next appt: Visit date not found

## 2024-07-08 ENCOUNTER — OFFICE VISIT (OUTPATIENT)
Dept: FAMILY MEDICINE CLINIC | Age: 49
End: 2024-07-08
Payer: COMMERCIAL

## 2024-07-08 VITALS
HEART RATE: 94 BPM | DIASTOLIC BLOOD PRESSURE: 62 MMHG | OXYGEN SATURATION: 98 % | HEIGHT: 68 IN | SYSTOLIC BLOOD PRESSURE: 118 MMHG | BODY MASS INDEX: 37.8 KG/M2 | WEIGHT: 249.4 LBS

## 2024-07-08 DIAGNOSIS — M79.641 RIGHT HAND PAIN: Primary | ICD-10-CM

## 2024-07-08 PROCEDURE — 99213 OFFICE O/P EST LOW 20 MIN: CPT | Performed by: NURSE PRACTITIONER

## 2024-07-08 RX ORDER — PREDNISONE 20 MG/1
TABLET ORAL
Qty: 15 TABLET | Refills: 0 | Status: SHIPPED | OUTPATIENT
Start: 2024-07-08 | End: 2024-07-20

## 2024-07-08 RX ORDER — PREDNISONE 20 MG/1
TABLET ORAL
Qty: 10 TABLET | Refills: 0 | OUTPATIENT
Start: 2024-07-08

## 2024-07-08 SDOH — ECONOMIC STABILITY: FOOD INSECURITY: WITHIN THE PAST 12 MONTHS, THE FOOD YOU BOUGHT JUST DIDN'T LAST AND YOU DIDN'T HAVE MONEY TO GET MORE.: NEVER TRUE

## 2024-07-08 SDOH — ECONOMIC STABILITY: INCOME INSECURITY: HOW HARD IS IT FOR YOU TO PAY FOR THE VERY BASICS LIKE FOOD, HOUSING, MEDICAL CARE, AND HEATING?: NOT HARD AT ALL

## 2024-07-08 SDOH — ECONOMIC STABILITY: FOOD INSECURITY: WITHIN THE PAST 12 MONTHS, YOU WORRIED THAT YOUR FOOD WOULD RUN OUT BEFORE YOU GOT MONEY TO BUY MORE.: NEVER TRUE

## 2024-07-08 ASSESSMENT — PATIENT HEALTH QUESTIONNAIRE - PHQ9
1. LITTLE INTEREST OR PLEASURE IN DOING THINGS: NOT AT ALL
SUM OF ALL RESPONSES TO PHQ QUESTIONS 1-9: 0
2. FEELING DOWN, DEPRESSED OR HOPELESS: NOT AT ALL
SUM OF ALL RESPONSES TO PHQ QUESTIONS 1-9: 0
SUM OF ALL RESPONSES TO PHQ9 QUESTIONS 1 & 2: 0
SUM OF ALL RESPONSES TO PHQ QUESTIONS 1-9: 0
SUM OF ALL RESPONSES TO PHQ QUESTIONS 1-9: 0

## 2024-07-08 NOTE — PROGRESS NOTES
PROGRESS NOTE     Gisselle Lew CNP  [facility]  [address] [place] [address], [address]  [facility] [address]  [phone] office  [phone] fax    Date of Service:  7/8/2024      Assessment / Plan:     1. Right hand pain  Appears only slightly improved from last office visit. Checking labs to day to rule out gout, inflammatory arthritis, rheumatoid cause. Staring longer taper of steroids.     - RHEUMATOID FACTOR  - LULI  - Sedimentation Rate  - Uric Acid  - predniSONE (DELTASONE) 20 MG tablet; Take 2 tablets by mouth daily for 3 days, THEN 1.5 tablets daily for 3 days, THEN 1 tablet daily for 3 days, THEN 0.5 tablets daily for 3 days.  Dispense: 15 tablet; Refill: 0    Subjective:      Patient ID: Delfino Cardona is a 48 y.o. male      CC: Right wrist pain    HPI  Patient started on augmentin and prednisone on 6/27. Patient reports no better.     Still having swelling, pain around the wrist and decrease  strength.     EMG last October through [facility] showed mild carpal tunnel.     He has had a flare up before but did not have this amount of swelling.       Vitals:    07/08/24 1557   BP: 118/62   Pulse: 94   SpO2: 98%   Weight: 113.1 kg (249 lb 6.4 oz)   Height: 1.727 m (5' 8\")       Outpatient Medications Marked as Taking for the 7/8/24 encounter (Office Visit) with Gisselle Lew APRN - CNP   Medication Sig Dispense Refill    predniSONE (DELTASONE) 20 MG tablet Take 2 tablets by mouth daily for 3 days, THEN 1.5 tablets daily for 3 days, THEN 1 tablet daily for 3 days, THEN 0.5 tablets daily for 3 days. 15 tablet 0    colchicine (COLCRYS) 0.6 MG tablet Take 1 tablet by mouth daily 30 tablet 1       Past Medical History:   Diagnosis Date    Gout     Seborrheic dermatitis        Past Surgical History:   Procedure Laterality Date    ORTHOPEDIC SURGERY  1992    2ndary to [facility] RLE, scar tissue removal       Social History     Tobacco Use    Smoking status: Never    Smokeless tobacco: Never   Substance Use

## 2024-07-09 LAB
ANA SER QL IA: NEGATIVE
ERYTHROCYTE [SEDIMENTATION RATE] IN BLOOD BY WESTERGREN METHOD: 31 MM/HR (ref 0–15)
RHEUMATOID FACT SER IA-ACNC: <10 IU/ML
URATE SERPL-MCNC: 8.6 MG/DL (ref 3.5–7.2)

## 2024-07-16 ENCOUNTER — TELEPHONE (OUTPATIENT)
Dept: FAMILY MEDICINE CLINIC | Age: 49
End: 2024-07-16

## 2024-07-16 NOTE — TELEPHONE ENCOUNTER
FYI is a fmla form from where he was off 3 days last week needs faxed by the 24th, asked to be completed as soon as possible,  once completed pt said he can pick it up. Is for [employer]. Tried to explain to pt that it may not be ready Thursday that we can call him once completed.

## 2024-07-18 NOTE — TELEPHONE ENCOUNTER
Form filled out. Can fax or have patient .     Please document call and then close encounter.  thanks

## 2024-07-18 NOTE — TELEPHONE ENCOUNTER
Form scanned into chart. Called/spoke to pt, informed form is completed. He will come and pick it up, form is at .

## 2024-08-30 ENCOUNTER — OFFICE VISIT (OUTPATIENT)
Dept: ORTHOPEDIC SURGERY | Age: 49
End: 2024-08-30

## 2024-08-30 VITALS — BODY MASS INDEX: 36.37 KG/M2 | HEIGHT: 68 IN | WEIGHT: 240 LBS

## 2024-08-30 DIAGNOSIS — M25.531 RIGHT WRIST PAIN: Primary | ICD-10-CM

## 2024-08-30 RX ORDER — METHYLPREDNISOLONE 4 MG
4 TABLET, DOSE PACK ORAL SEE ADMIN INSTRUCTIONS
Qty: 1 KIT | Refills: 0 | Status: SHIPPED | OUTPATIENT
Start: 2024-08-30 | End: 2024-09-06

## 2024-08-30 NOTE — PROGRESS NOTES
Mr. Sam Cardona is a 49 y.o. right handed man  who is seen today in Hand Surgical Consultation at the request of Gisselle Lew APRN - CNP.    He is seen today regarding a 1 year(s) history of right wrist pain and swelling without history of previous injury. He states that he has been diagnosed with carpal tunnel syndrome and that his hand \"flares up\" from time to time.  He was seen for this concern by his primary care physician; previous treatment has included  oral prednisone .  He reports moderate pain located in the Radial, Dorsal, and Volar wrist, no tenderness of the remaining hand or elbow.  He  notes today, minimal neurologic symptoms in the hand. Symptoms show no change over time.   He states that his wrist has been like this for the last year and he had his most recent flare up in June. When asked if he has a history of gout, he stated \" this isn't gout, I've had gout in my big toe and this isnt gout\".     I have today reviewed with Sam Cardona the clinically relevant, past medical history, medications, allergies,  family history, social history, and Review Of Systems & I have documented any details relevant to today's presenting complaints in my history above.  Mr. Sam Cardona's self-reported past medical history, medications, allergies,  family history, social history, and Review Of Systems have been scanned into the chart under the \"Media\" tab.    Physical Exam:  Mr. Sam Cardona's most recent vitals:  Vitals  Height: 172.7 cm (5' 8\")  Weight - Scale: 108.9 kg (240 lb)    He is well nourished, oriented to person, place & time.  He demonstrates appropriate mood and affect as well as normal gait and station.    Skin: Normal in appearance, Normal Color, and Free of Lesions Bilaterally the skin over the right forearm, wrist and hand is warm to touch, red in color and exquisitely painful to light touch  Digital range of motion is limited by pain and swelling  on the Right, normal on the Left  Wrist  range of motion is limited by pain and swelling on the Right, normal on the Left  Sensation is subjectively normal in the Whole Hand.  All other digits are normally sensate bilaterally  Vascular examination reveals normal, good capillary refill, and good color bilaterally.   There is no ecchymosis bilaterally.  Swelling is moderate in the Dorsal wrist.  No other digit or hand bilaterally shows sign of swelling.  There is no evidence of gross joint instability bilaterally.  Muscular strength is clinically appropriate bilaterally.  Maximal pain is elicited with palpation of the Dorsal and Volar aspect of the wrist, specifically  the Radio-carpal Joint and Ulno-carpal Joint is exquisitely tender to palpation There is not clinical evidence of skeletal deformity or mal-rotation.        Radiographic Evaluation:  Radiographs, taken In My Office were Personally Reviewed & Interpreted by myself today (3 views of the right wrist).  They demonstrate no evidence of an acute fracture of the distal radius, ulna, or carpal bones.  There is no widening of the scapho-lunate interval &  no flexion of the scaphoid.   There is no evidence of CYSTIC degenerative change multiple carpal bone(s), consistent with an inflammatory joint condition, all other aspects of the carpus are relatively spared.  There is not evidence of other injury or bony fracture.      Impression:  Mr. Sam Cardona has developed wrist pain with an inflammatory etiology,  which is currently exacerbated, and presents requesting further treatment.    Plan:  I have had a thorough discussion with Mr. Sam Cardona regarding the treatment options available for his initially presenting Right Gout, which is causing him significant symptoms and difficulty.  I have outlined for Mr. Sam Cardona the risk, benefits and consequences of the various treatment modalities, including a reasonable expectation for the long term success of each.  We have discussed the possibility that

## 2024-10-10 ENCOUNTER — OFFICE VISIT (OUTPATIENT)
Dept: FAMILY MEDICINE CLINIC | Age: 49
End: 2024-10-10

## 2024-10-10 VITALS
DIASTOLIC BLOOD PRESSURE: 80 MMHG | WEIGHT: 248.2 LBS | SYSTOLIC BLOOD PRESSURE: 118 MMHG | HEART RATE: 109 BPM | OXYGEN SATURATION: 97 % | BODY MASS INDEX: 37.74 KG/M2

## 2024-10-10 DIAGNOSIS — Z12.11 SCREENING FOR COLON CANCER: ICD-10-CM

## 2024-10-10 DIAGNOSIS — Z23 NEED FOR INFLUENZA VACCINATION: ICD-10-CM

## 2024-10-10 DIAGNOSIS — M10.00 ACUTE IDIOPATHIC GOUT, UNSPECIFIED SITE: Primary | ICD-10-CM

## 2024-10-10 RX ORDER — ALLOPURINOL 100 MG/1
100 TABLET ORAL DAILY
Qty: 90 TABLET | Refills: 1 | Status: SHIPPED | OUTPATIENT
Start: 2024-10-10

## 2024-10-10 NOTE — PROGRESS NOTES
PROGRESS NOTE     Gisselle Lew CNP  [facility]  [address] [place] [address], [address]  [facility] [address]  [phone] office  [phone] fax    Date of Service:  10/10/2024    Assessment / Plan:     1. Acute idiopathic gout, unspecified site  Several recent flares. No pain currently but filled out FMLA for work missed in September. Will start prophylactic allopurinol. Follow up in 2 months to check renal function.     - allopurinol (ZYLOPRIM) 100 MG tablet; Take 1 tablet by mouth daily  Dispense: 90 tablet; Refill: 1    2. Screening for colon cancer    - Fecal DNA Colorectal cancer screening (Cologuard)    3. Need for influenza vaccination    - Influenza, FLUCELVAX Trivalent, (age 6 mo+) IM, Preservative Free, 0.5mL    Subjective:      Patient ID: .Sam Cardona is a 49 y.o. male      CC: Right hand pain    HPI  Patient has been treated for gout several times in the past couple months.     Had to miss work on 9/23-9/25 due to another flare.     He did follow up with Dr. Hull who felt definitely arthritic in nature.       Vitals:    10/10/24 1426   BP: 118/80   Pulse: (!) 109   SpO2: 97%   Weight: 112.6 kg (248 lb 3.2 oz)       Outpatient Medications Marked as Taking for the 10/10/24 encounter (Office Visit) with Gisselle Lew APRN - CNP   Medication Sig Dispense Refill    allopurinol (ZYLOPRIM) 100 MG tablet Take 1 tablet by mouth daily 90 tablet 1    colchicine (COLCRYS) 0.6 MG tablet Take 1 tablet by mouth daily (Patient taking differently: Take 1 tablet by mouth daily PRN) 30 tablet 1       Past Medical History:   Diagnosis Date    Gout     Seborrheic dermatitis        Past Surgical History:   Procedure Laterality Date    ORTHOPEDIC SURGERY  1992    2ndary to [facility] RLE, scar tissue removal       Social History     Tobacco Use    Smoking status: Never    Smokeless tobacco: Never   Substance Use Topics    Alcohol use: Yes     Comment: socially       Family History   Problem Relation Age of Onset

## 2024-12-27 DIAGNOSIS — M10.9 GOUT INVOLVING TOE OF LEFT FOOT, UNSPECIFIED CAUSE, UNSPECIFIED CHRONICITY: ICD-10-CM

## 2024-12-27 RX ORDER — COLCHICINE 0.6 MG/1
0.6 TABLET ORAL DAILY
Qty: 30 TABLET | Refills: 4 | Status: SHIPPED | OUTPATIENT
Start: 2024-12-27

## 2024-12-27 NOTE — TELEPHONE ENCOUNTER
Medication:   Requested Prescriptions     Pending Prescriptions Disp Refills    colchicine (COLCRYS) 0.6 MG tablet 30 tablet 0     Sig: Take 1 tablet by mouth daily PRN        Last Filled:      Patient Phone Number: [phone] (home)     Last appt: 10/10/2024   Next appt: Visit date not found    Last OARRS:        No data to display

## 2024-12-27 NOTE — TELEPHONE ENCOUNTER
Pt called for refill of his colchicine (COLCRYS) 0.6 MG tablet to be called into [facility]. Pt said that the gout is in his left foot and is housebound so he will have someone else get it. He would like a call back when sent at [phone]. Pt aware [facility] not in and asked if np could fill

## 2025-08-21 ENCOUNTER — PATIENT MESSAGE (OUTPATIENT)
Dept: FAMILY MEDICINE CLINIC | Age: 50
End: 2025-08-21

## 2025-08-21 ENCOUNTER — OFFICE VISIT (OUTPATIENT)
Dept: FAMILY MEDICINE CLINIC | Age: 50
End: 2025-08-21
Payer: COMMERCIAL

## 2025-08-21 VITALS
WEIGHT: 254 LBS | OXYGEN SATURATION: 98 % | DIASTOLIC BLOOD PRESSURE: 88 MMHG | HEART RATE: 92 BPM | BODY MASS INDEX: 38.62 KG/M2 | SYSTOLIC BLOOD PRESSURE: 130 MMHG

## 2025-08-21 DIAGNOSIS — M1A.9XX0 CHRONIC GOUT INVOLVING TOE OF RIGHT FOOT WITHOUT TOPHUS, UNSPECIFIED CAUSE: Primary | ICD-10-CM

## 2025-08-21 PROCEDURE — 99213 OFFICE O/P EST LOW 20 MIN: CPT | Performed by: NURSE PRACTITIONER

## 2025-08-21 SDOH — ECONOMIC STABILITY: FOOD INSECURITY: WITHIN THE PAST 12 MONTHS, YOU WORRIED THAT YOUR FOOD WOULD RUN OUT BEFORE YOU GOT MONEY TO BUY MORE.: NEVER TRUE

## 2025-08-21 SDOH — ECONOMIC STABILITY: FOOD INSECURITY: WITHIN THE PAST 12 MONTHS, THE FOOD YOU BOUGHT JUST DIDN'T LAST AND YOU DIDN'T HAVE MONEY TO GET MORE.: NEVER TRUE

## 2025-08-21 ASSESSMENT — PATIENT HEALTH QUESTIONNAIRE - PHQ9
1. LITTLE INTEREST OR PLEASURE IN DOING THINGS: NOT AT ALL
SUM OF ALL RESPONSES TO PHQ QUESTIONS 1-9: 0
2. FEELING DOWN, DEPRESSED OR HOPELESS: NOT AT ALL
SUM OF ALL RESPONSES TO PHQ QUESTIONS 1-9: 0